# Patient Record
Sex: FEMALE | Race: WHITE | Employment: OTHER | URBAN - METROPOLITAN AREA
[De-identification: names, ages, dates, MRNs, and addresses within clinical notes are randomized per-mention and may not be internally consistent; named-entity substitution may affect disease eponyms.]

---

## 2019-06-11 ENCOUNTER — HOSPITAL ENCOUNTER (OUTPATIENT)
Dept: GENERAL RADIOLOGY | Age: 79
Discharge: HOME | End: 2019-06-13
Payer: MEDICARE

## 2019-06-11 ENCOUNTER — HOSPITAL ENCOUNTER (OUTPATIENT)
Age: 79
Discharge: HOME | End: 2019-06-13
Payer: MEDICARE

## 2019-06-11 DIAGNOSIS — M75.22 BICEPS TENDINITIS OF BOTH SHOULDERS: ICD-10-CM

## 2019-06-11 DIAGNOSIS — M75.21 BICEPS TENDINITIS OF BOTH SHOULDERS: ICD-10-CM

## 2019-06-11 PROCEDURE — 73030 X-RAY EXAM OF SHOULDER: CPT

## 2020-01-27 ENCOUNTER — HOSPITAL ENCOUNTER (OUTPATIENT)
Dept: GENERAL RADIOLOGY | Age: 80
Discharge: HOME | End: 2020-01-29
Payer: MEDICARE

## 2020-01-27 ENCOUNTER — HOSPITAL ENCOUNTER (OUTPATIENT)
Age: 80
Discharge: HOME | End: 2020-01-29
Payer: MEDICARE

## 2020-01-27 PROCEDURE — 72050 X-RAY EXAM NECK SPINE 4/5VWS: CPT

## 2020-01-27 PROCEDURE — 72110 X-RAY EXAM L-2 SPINE 4/>VWS: CPT

## 2021-11-11 ENCOUNTER — OFFICE VISIT (OUTPATIENT)
Dept: CARDIOLOGY CLINIC | Age: 81
End: 2021-11-11
Payer: MEDICARE

## 2021-11-11 VITALS
HEART RATE: 86 BPM | SYSTOLIC BLOOD PRESSURE: 102 MMHG | WEIGHT: 179.6 LBS | BODY MASS INDEX: 33.91 KG/M2 | DIASTOLIC BLOOD PRESSURE: 76 MMHG | OXYGEN SATURATION: 94 % | HEIGHT: 61 IN | RESPIRATION RATE: 18 BRPM

## 2021-11-11 DIAGNOSIS — E78.00 HYPERCHOLESTEREMIA: ICD-10-CM

## 2021-11-11 DIAGNOSIS — I10 PRIMARY HYPERTENSION: ICD-10-CM

## 2021-11-11 DIAGNOSIS — I25.10 CORONARY ARTERY DISEASE WITHOUT ANGINA PECTORIS, UNSPECIFIED VESSEL OR LESION TYPE, UNSPECIFIED WHETHER NATIVE OR TRANSPLANTED HEART: Primary | ICD-10-CM

## 2021-11-11 DIAGNOSIS — I25.5 ISCHEMIC CARDIOMYOPATHY: ICD-10-CM

## 2021-11-11 DIAGNOSIS — E66.09 CLASS 1 OBESITY DUE TO EXCESS CALORIES WITHOUT SERIOUS COMORBIDITY WITH BODY MASS INDEX (BMI) OF 34.0 TO 34.9 IN ADULT: ICD-10-CM

## 2021-11-11 PROCEDURE — 93000 ELECTROCARDIOGRAM COMPLETE: CPT | Performed by: INTERNAL MEDICINE

## 2021-11-11 PROCEDURE — 99204 OFFICE O/P NEW MOD 45 MIN: CPT | Performed by: INTERNAL MEDICINE

## 2021-11-11 NOTE — PROGRESS NOTES
CHIEF COMPLAINT:   Chief Complaint   Patient presents with    Coronary Artery Disease     new patient- pt has complaints of sob        HISTORY OF PRESENT ILLNESS: Patient is a 80 y.o. female seen at the request of Marco A Latham MD to establish care with me. She has a history of ischemic cardiomyopathy, with recovered ejection fraction, coronary artery disease status post PCI, hypertension, hyperlipidemia, moderate obesity. At today's office visit, Shedenies any chest pain, shortness of breath, palpitations, dizziness, pedal edema. The patient is capable of activities of daily living. There is dyspnea on more than moderate exertion. There is no orthopnea or PND. She is compliant with medications as well as diet and exercise regimen. Prior Cardiac workup:  History of ischemic cardiomyopathy since 2007, with echocardiogram at      that time showing EF of 25%, and a cardiac catheterization in May 2007,      showing LAD stenosis; 80% to 90%; and an EF of 20% to 25%. No disease of      the circumflex or RCA. Had a drug-eluting stent to the LAD with last      Lexiscan nuclear stress test in June 2014, being nonischemic and      hyperdynamic left ventricular systolic function. Nuclear perfusion scan from 4/9/2015: No perfusion defects. Past Medical History:   Diagnosis Date    ACL (anterior cruciate ligament) rupture     Acute ischemic colitis ([address] 4/30/2012    Anxiety     Arthritis     CAD (coronary artery disease)     Cardiomyopathy ([address]     Carotid artery stenosis 1/5/2012     Carotid ultrasound showed 50-69% stenosis of the middle segment of the left internal carotid artery.     Chest pain 4/8/2015    CHF (congestive heart failure) ([address]     Diabetes mellitus ([address]     no meds    Diabetes mellitus type 2, controlled ([address] 1/18/2013    Diabetic retinopathy ([address]     Generalized osteoarthritis of multiple sites     Heart murmur     History of tobacco abuse     Hypercalcemia     Hypercholesterolemia     Hyperlipidemia     Hypertension     LV dysfunction     Severe.  Mitral regurgitation     mild    Obesity     Preoperative clearance 2-13-15    Cardiac clearance for OR 3-18-15 from Dr. Flavio Bullard in [facility] letters    Rectal bleeding 4/20/2012 4/26/2012 Exploratory laparotomy, lysis of excessive adhesions, left hemicolectomy and intraoperative colonoscopy:  Findings consistnet with ischemic colitis, segmental, 50 cm from the anal verge. Pathology negative for malignancy.  Renal cyst, left     On ultrasound 5/1/2007. remains current    Tear meniscus knee 3/17/2011    Small medial meniscal tear and medial collateral ligament tear of right knee noted on MRI. S/P right knee arthroscopic surgery in 6/2011.  Tear meniscus knee     Thyroid nodule        Allergies   Allergen Reactions    Meloxicam      Sensitivity. Muscle aches, fullness in chest.    Metformin And Related [Metformin And Related]      Caused metabolic acidosis. Current Outpatient Medications   Medication Sig Dispense Refill    nitroGLYCERIN (NITROSTAT) 0.4 MG SL tablet Place 1 tablet under the tongue every 5 minutes as needed for Chest pain 25 tablet 3    mirtazapine (REMERON) 15 MG tablet Take 1 tablet by mouth nightly. 30 tablet 3    carvedilol (COREG) 25 MG tablet Take 25 mg by mouth 2 times daily (with meals). Instructed to take with sip water am of procedure      Calcium Carbonate-Vitamin D (CALCIUM + D) 600-200 MG-UNIT TABS Take 1 tablet by mouth daily. LD 3/11/2015      lisinopril (PRINIVIL;ZESTRIL) 20 MG tablet Take 20 mg by mouth daily.  furosemide (LASIX) 20 MG tablet Take 20 mg by mouth daily.  levothyroxine (SYNTHROID) 25 MCG tablet Take 25 mcg by mouth Daily.  aspirin 81 MG tablet Take 81 mg by mouth daily. LD 3/11/2015      isosorbide mononitrate (IMDUR) 30 MG CR tablet Take 1 tablet by mouth daily.  30 tablet 12    Ranitidine HCl (ZANTAC 75 PO) Take 1 tablet by mouth as  Marital Status: Not on file   Intimate Partner Violence:     Fear of Current or Ex-Partner: Not on file    Emotionally Abused: Not on file    Physically Abused: Not on file    Sexually Abused: Not on file   Housing Stability:     Unable to Pay for Housing in the Last Year: Not on file    Number of Places Lived in the Last Year: Not on file    Unstable Housing in the Last Year: Not on file       History reviewed. No pertinent family history. Review of Systems  Constitutional: Negative for fever, malaise/fatigue and weight loss. HENT: Negative for sore throat and tinnitus. Eyes: Negative for blurred vision and double vision. Respiratory: Negative for shortness of breath. Negative for cough and wheezing. Cardiovascular: As mentioned in HPI. Gastrointestinal: Negative for abdominal pain, heartburn, nausea and vomiting. Genitourinary: Negative. Musculoskeletal: Negative for back pain, joint pain and myalgias. Neurological: Negative for dizziness, tremors, loss of consciousness and headaches. Endo/Heme/Allergies: Negative. Psychiatric/Behavioral: Negative for depression and suicidal ideas. Physical Exam   /76   Pulse 86   Resp 18   Ht 5' 1\" (1.549 m)   Wt 179 lb 9.6 oz (81.5 kg)   SpO2 94%   BMI 33.94 kg/m²   Constitutional: Oriented to person, place, and time. Well-developed and well-nourished. No distress. Head: Normocephalic and atraumatic. Eyes: EOM are normal. Pupils are equal, round, and reactive to light. Neck: Normal range of motion. Neck supple. No hepatojugular reflux and no JVD present. Carotid bruit is not present. No tracheal deviation present. No thyromegaly present. Cardiovascular: Normal rate, regular rhythm, normal heart sounds and intact distal pulses. Exam reveals no gallop and no friction rub. No murmur heard. Pulmonary/Chest: Effort normal and breath sounds normal. No respiratory distress. No wheezes. No rales. No tenderness.    Abdominal: Soft. Bowel sounds are normal. No distension and no mass. No tenderness. No rebound and no guarding. Musculoskeletal: Normal range of motion. No edema and no tenderness. Lymphadenopathy:   No cervical adenopathy. Neurological: Alert and oriented to person, place, and time. Skin: Skin is warm and dry. No rash noted. Not diaphoretic. No erythema. Psychiatric: Normal mood and affect. Behavior is normal.     Procedures and Testing  EKG: Done in the office today and reviewed by me. Normal sinus rhythm. No other abnormalities detected. ASSESSMENT:   Diagnosis Orders   1. Coronary artery disease without angina pectoris, unspecified vessel or lesion type, unspecified whether native or transplanted heart  EKG 12 Lead    ECHO Complete 2D W Doppler W Color   2. Primary hypertension     3. Hypercholesteremia     4. Class 1 obesity due to excess calories without serious comorbidity with body mass index (BMI) of 34.0 to 34.9 in adult     5. Ischemic cardiomyopathy          Plan:  1. Coronary artery disease status post PCI: She is stable and asymptomatic. Continue with medical management. Continue aspirin 81, Coreg 25 twice daily and Imdur 30 mg daily. She had blood work done recently and I will have those faxed to my office for review. 2.  Hypertension: Pressure is at goal in clinic today. Continue medications as above. She is also on Lasix 20 mg daily. 3.  Ischemic cardiomyopathy with recovered ejection fraction: She appears to be stable and asymptomatic. Continue medications as above. We will check an echocardiogram to assess biventricular function and rule out any significant valvular abnormalities. 4.  Hyperlipidemia: Currently, she is not on any statin therapy. I will have her recent blood work faxed to my office for review. Dietary counseling provided. 5.  Moderate obesity: Counseled for compliance with diet, exercise and weight loss. Follow-up in the office in 6 months.       Elieser Venegas MD  [facility] Cardiology     NOTE: This report was transcribed using voice recognition software. Every effort was made to ensure accuracy; however, inadvertent computerized transcription errors may be present.

## 2021-11-11 NOTE — PATIENT INSTRUCTIONS
 Continue all your medications at current doses.  We will get your recent blood work.  We will schedule an echocardiogram.    Restrict sodium intake to less than 2-2.5 g/day. Restrict fluid intake to less than 2.2 L/day. Goal BP is less than 130/80.  Please try to exercise for 150 minutes a week.  I will see you back in the office in 6 months. Please call the office at ([phone], option [phone]) if you have any questions.

## 2022-01-04 ENCOUNTER — HOSPITAL ENCOUNTER (INPATIENT)
Age: 82
LOS: 4 days | Discharge: HOME | DRG: 177 | End: 2022-01-08
Attending: EMERGENCY MEDICINE | Admitting: INTERNAL MEDICINE
Payer: MEDICARE

## 2022-01-04 ENCOUNTER — APPOINTMENT (OUTPATIENT)
Dept: GENERAL RADIOLOGY | Age: 82
DRG: 177 | End: 2022-01-04
Payer: MEDICARE

## 2022-01-04 ENCOUNTER — APPOINTMENT (OUTPATIENT)
Dept: CT IMAGING | Age: 82
DRG: 177 | End: 2022-01-04
Payer: MEDICARE

## 2022-01-04 DIAGNOSIS — J18.9 PNEUMONIA OF LEFT LOWER LOBE DUE TO INFECTIOUS ORGANISM: Primary | ICD-10-CM

## 2022-01-04 PROBLEM — N17.9 AKI (ACUTE KIDNEY INJURY) (HCC): Status: ACTIVE | Noted: 2022-01-04

## 2022-01-04 PROBLEM — U07.1 PNEUMONIA DUE TO COVID-19 VIRUS: Status: ACTIVE | Noted: 2022-01-04

## 2022-01-04 PROBLEM — J12.82 PNEUMONIA DUE TO COVID-19 VIRUS: Status: ACTIVE | Noted: 2022-01-04

## 2022-01-04 LAB
ANION GAP SERPL CALCULATED.3IONS-SCNC: 18 MMOL/L (ref 7–16)
BASOPHILS ABSOLUTE: 0.01 E9/L (ref 0–0.2)
BASOPHILS RELATIVE PERCENT: 0.3 % (ref 0–2)
BUN BLDV-MCNC: 45 MG/DL (ref 6–23)
C-REACTIVE PROTEIN: 6.7 MG/DL (ref 0–0.4)
CALCIUM SERPL-MCNC: 9.5 MG/DL (ref 8.6–10.2)
CHLORIDE BLD-SCNC: 103 MMOL/L (ref 98–107)
CHLORIDE URINE RANDOM: 25 MMOL/L
CO2: 17 MMOL/L (ref 22–29)
CREAT SERPL-MCNC: 2 MG/DL (ref 0.5–1)
D DIMER: 598 NG/ML DDU
EOSINOPHILS ABSOLUTE: 0 E9/L (ref 0.05–0.5)
EOSINOPHILS RELATIVE PERCENT: 0 % (ref 0–6)
FERRITIN: 1379 NG/ML
GFR AFRICAN AMERICAN: 29
GFR NON-AFRICAN AMERICAN: 24 ML/MIN/1.73
GLUCOSE BLD-MCNC: 115 MG/DL (ref 74–99)
HCT VFR BLD CALC: 32.9 % (ref 34–48)
HEMOGLOBIN: 10.8 G/DL (ref 11.5–15.5)
IMMATURE GRANULOCYTES #: 0.05 E9/L
IMMATURE GRANULOCYTES %: 1.6 % (ref 0–5)
INFLUENZA A BY PCR: NOT DETECTED
INFLUENZA B BY PCR: NOT DETECTED
LACTATE DEHYDROGENASE: 437 U/L (ref 135–214)
LACTIC ACID: 1.2 MMOL/L (ref 0.5–2.2)
LYMPHOCYTES ABSOLUTE: 1.15 E9/L (ref 1.5–4)
LYMPHOCYTES RELATIVE PERCENT: 35.7 % (ref 20–42)
MCH RBC QN AUTO: 30.7 PG (ref 26–35)
MCHC RBC AUTO-ENTMCNC: 32.8 % (ref 32–34.5)
MCV RBC AUTO: 93.5 FL (ref 80–99.9)
MONOCYTES ABSOLUTE: 0.22 E9/L (ref 0.1–0.95)
MONOCYTES RELATIVE PERCENT: 6.8 % (ref 2–12)
NEUTROPHILS ABSOLUTE: 1.79 E9/L (ref 1.8–7.3)
NEUTROPHILS RELATIVE PERCENT: 55.6 % (ref 43–80)
PDW BLD-RTO: 13.5 FL (ref 11.5–15)
PLATELET # BLD: 65 E9/L (ref 130–450)
PLATELET CONFIRMATION: NORMAL
PMV BLD AUTO: 12.4 FL (ref 7–12)
POTASSIUM REFLEX MAGNESIUM: 3.8 MMOL/L (ref 3.5–5)
POTASSIUM, UR: 19.9 MMOL/L
PROCALCITONIN: 0.3 NG/ML (ref 0–0.08)
PROCALCITONIN: 0.34 NG/ML (ref 0–0.08)
RBC # BLD: 3.52 E12/L (ref 3.5–5.5)
SARS-COV-2, NAAT: DETECTED
SODIUM BLD-SCNC: 138 MMOL/L (ref 132–146)
SODIUM URINE: 48 MMOL/L
TROPONIN, HIGH SENSITIVITY: 44 NG/L (ref 0–9)
UREA NITROGEN, UR: 894 MG/DL (ref 800–1666)
WBC # BLD: 3.2 E9/L (ref 4.5–11.5)

## 2022-01-04 PROCEDURE — 2580000003 HC RX 258: Performed by: INTERNAL MEDICINE

## 2022-01-04 PROCEDURE — 36415 COLL VENOUS BLD VENIPUNCTURE: CPT

## 2022-01-04 PROCEDURE — 84133 ASSAY OF URINE POTASSIUM: CPT

## 2022-01-04 PROCEDURE — 6360000002 HC RX W HCPCS: Performed by: INTERNAL MEDICINE

## 2022-01-04 PROCEDURE — 87635 SARS-COV-2 COVID-19 AMP PRB: CPT

## 2022-01-04 PROCEDURE — 82436 ASSAY OF URINE CHLORIDE: CPT

## 2022-01-04 PROCEDURE — 83615 LACTATE (LD) (LDH) ENZYME: CPT

## 2022-01-04 PROCEDURE — 82728 ASSAY OF FERRITIN: CPT

## 2022-01-04 PROCEDURE — 85378 FIBRIN DEGRADE SEMIQUANT: CPT

## 2022-01-04 PROCEDURE — 93005 ELECTROCARDIOGRAM TRACING: CPT | Performed by: EMERGENCY MEDICINE

## 2022-01-04 PROCEDURE — 2500000003 HC RX 250 WO HCPCS: Performed by: INTERNAL MEDICINE

## 2022-01-04 PROCEDURE — 84540 ASSAY OF URINE/UREA-N: CPT

## 2022-01-04 PROCEDURE — 83605 ASSAY OF LACTIC ACID: CPT

## 2022-01-04 PROCEDURE — 70450 CT HEAD/BRAIN W/O DYE: CPT

## 2022-01-04 PROCEDURE — 84484 ASSAY OF TROPONIN QUANT: CPT

## 2022-01-04 PROCEDURE — 6370000000 HC RX 637 (ALT 250 FOR IP): Performed by: INTERNAL MEDICINE

## 2022-01-04 PROCEDURE — 6370000000 HC RX 637 (ALT 250 FOR IP): Performed by: EMERGENCY MEDICINE

## 2022-01-04 PROCEDURE — 2140000000 HC CCU INTERMEDIATE R&B

## 2022-01-04 PROCEDURE — 85025 COMPLETE CBC W/AUTO DIFF WBC: CPT

## 2022-01-04 PROCEDURE — 71045 X-RAY EXAM CHEST 1 VIEW: CPT

## 2022-01-04 PROCEDURE — 80048 BASIC METABOLIC PNL TOTAL CA: CPT

## 2022-01-04 PROCEDURE — 84300 ASSAY OF URINE SODIUM: CPT

## 2022-01-04 PROCEDURE — 84145 PROCALCITONIN (PCT): CPT

## 2022-01-04 PROCEDURE — 86140 C-REACTIVE PROTEIN: CPT

## 2022-01-04 PROCEDURE — 87502 INFLUENZA DNA AMP PROBE: CPT

## 2022-01-04 PROCEDURE — 87449 NOS EACH ORGANISM AG IA: CPT

## 2022-01-04 PROCEDURE — 99285 EMERGENCY DEPT VISIT HI MDM: CPT

## 2022-01-04 RX ORDER — LEVOTHYROXINE SODIUM 0.03 MG/1
25 TABLET ORAL DAILY
Status: DISCONTINUED | OUTPATIENT
Start: 2022-01-04 | End: 2022-01-08 | Stop reason: HOSPADM

## 2022-01-04 RX ORDER — MIRTAZAPINE 15 MG/1
15 TABLET, FILM COATED ORAL NIGHTLY
Status: DISCONTINUED | OUTPATIENT
Start: 2022-01-04 | End: 2022-01-08 | Stop reason: HOSPADM

## 2022-01-04 RX ORDER — SODIUM CHLORIDE 0.9 % (FLUSH) 0.9 %
5-40 SYRINGE (ML) INJECTION PRN
Status: DISCONTINUED | OUTPATIENT
Start: 2022-01-04 | End: 2022-01-08 | Stop reason: HOSPADM

## 2022-01-04 RX ORDER — ACETAMINOPHEN 650 MG/1
650 SUPPOSITORY RECTAL EVERY 6 HOURS PRN
Status: DISCONTINUED | OUTPATIENT
Start: 2022-01-04 | End: 2022-01-08 | Stop reason: HOSPADM

## 2022-01-04 RX ORDER — 0.9 % SODIUM CHLORIDE 0.9 %
1000 INTRAVENOUS SOLUTION INTRAVENOUS ONCE
Status: COMPLETED | OUTPATIENT
Start: 2022-01-04 | End: 2022-01-04

## 2022-01-04 RX ORDER — ACETAMINOPHEN 325 MG/1
650 TABLET ORAL EVERY 6 HOURS PRN
Status: DISCONTINUED | OUTPATIENT
Start: 2022-01-04 | End: 2022-01-08 | Stop reason: HOSPADM

## 2022-01-04 RX ORDER — CARVEDILOL 25 MG/1
25 TABLET ORAL 2 TIMES DAILY WITH MEALS
Status: DISCONTINUED | OUTPATIENT
Start: 2022-01-04 | End: 2022-01-08 | Stop reason: HOSPADM

## 2022-01-04 RX ORDER — SODIUM CHLORIDE 9 MG/ML
25 INJECTION, SOLUTION INTRAVENOUS PRN
Status: DISCONTINUED | OUTPATIENT
Start: 2022-01-04 | End: 2022-01-08 | Stop reason: HOSPADM

## 2022-01-04 RX ORDER — ISOSORBIDE MONONITRATE 30 MG/1
30 TABLET, EXTENDED RELEASE ORAL DAILY
Status: DISCONTINUED | OUTPATIENT
Start: 2022-01-04 | End: 2022-01-08 | Stop reason: HOSPADM

## 2022-01-04 RX ORDER — ASPIRIN 81 MG/1
81 TABLET ORAL DAILY
Status: DISCONTINUED | OUTPATIENT
Start: 2022-01-04 | End: 2022-01-08 | Stop reason: HOSPADM

## 2022-01-04 RX ORDER — SODIUM CHLORIDE 9 MG/ML
INJECTION, SOLUTION INTRAVENOUS CONTINUOUS
Status: DISCONTINUED | OUTPATIENT
Start: 2022-01-04 | End: 2022-01-05

## 2022-01-04 RX ORDER — HEPARIN SODIUM 10000 [USP'U]/ML
5000 INJECTION, SOLUTION INTRAVENOUS; SUBCUTANEOUS EVERY 8 HOURS SCHEDULED
Status: DISCONTINUED | OUTPATIENT
Start: 2022-01-04 | End: 2022-01-08 | Stop reason: HOSPADM

## 2022-01-04 RX ORDER — SODIUM CHLORIDE 0.9 % (FLUSH) 0.9 %
5-40 SYRINGE (ML) INJECTION EVERY 12 HOURS SCHEDULED
Status: DISCONTINUED | OUTPATIENT
Start: 2022-01-04 | End: 2022-01-08 | Stop reason: HOSPADM

## 2022-01-04 RX ORDER — ACETAMINOPHEN 325 MG/1
650 TABLET ORAL ONCE
Status: COMPLETED | OUTPATIENT
Start: 2022-01-04 | End: 2022-01-04

## 2022-01-04 RX ADMIN — Medication 10 ML: at 20:22

## 2022-01-04 RX ADMIN — CEFTRIAXONE 1000 MG: 1 INJECTION, POWDER, FOR SOLUTION INTRAMUSCULAR; INTRAVENOUS at 16:25

## 2022-01-04 RX ADMIN — SODIUM CHLORIDE: 9 INJECTION, SOLUTION INTRAVENOUS at 20:11

## 2022-01-04 RX ADMIN — ASPIRIN 81 MG: 81 TABLET, COATED ORAL at 20:12

## 2022-01-04 RX ADMIN — CARVEDILOL 25 MG: 25 TABLET, FILM COATED ORAL at 20:12

## 2022-01-04 RX ADMIN — DOXYCYCLINE 100 MG: 100 INJECTION, POWDER, LYOPHILIZED, FOR SOLUTION INTRAVENOUS at 16:25

## 2022-01-04 RX ADMIN — HEPARIN SODIUM 5000 UNITS: 10000 INJECTION INTRAVENOUS; SUBCUTANEOUS at 22:00

## 2022-01-04 RX ADMIN — MIRTAZAPINE 15 MG: 15 TABLET, FILM COATED ORAL at 21:00

## 2022-01-04 RX ADMIN — ACETAMINOPHEN 650 MG: 325 TABLET ORAL at 20:12

## 2022-01-04 RX ADMIN — SODIUM CHLORIDE 1000 ML: 9 INJECTION, SOLUTION INTRAVENOUS at 12:21

## 2022-01-04 RX ADMIN — CALCIUM CARBONATE-VITAMIN D TAB 500 MG-200 UNIT 1 TABLET: 500-200 TAB at 20:12

## 2022-01-04 ASSESSMENT — ENCOUNTER SYMPTOMS
VOICE CHANGE: 0
NAUSEA: 0
WHEEZING: 0
CONSTIPATION: 0
BLOOD IN STOOL: 0
VOMITING: 0
SORE THROAT: 1
TROUBLE SWALLOWING: 0
APNEA: 0
SINUS PAIN: 0
STRIDOR: 0
CHOKING: 0
PHOTOPHOBIA: 0
CHEST TIGHTNESS: 0
EYE PAIN: 0
ABDOMINAL PAIN: 0
COUGH: 1
SHORTNESS OF BREATH: 1
DIARRHEA: 1
RHINORRHEA: 0
SINUS PRESSURE: 0

## 2022-01-04 ASSESSMENT — PAIN SCALES - GENERAL
PAINLEVEL_OUTOF10: 3
PAINLEVEL_OUTOF10: 6

## 2022-01-04 NOTE — PROGRESS NOTES
Pharmacy Consultation Note    Consult date: 1/4/2022  Physician/provider: Dr. Alma Delia Woodruff has been consulted to evaluate criteria for Baricitinib or Remdesivir therapy. Based on the algorithm, the patient DOES NOT currently meet [facility] [employer] approved Covid-19 treatment algorithm for Baricitinib or Remdesivir. Provider has been notified. Please re-consult if the clinical condition changes and patient meets criteria for initiation based on [facility] [employer] approved criteria for use.     Thank you for the consult,  Duard Bumpers, [facility]

## 2022-01-04 NOTE — H&P
Hospitalist History & Physical      PCP: Stan Morse MD    Date of Admission: 1/4/2022    Date of Service: Pt seen/examined on 1/4/2022 and is admitted to Inpatient with expected LOS greater than two midnights due to medical therapy. Chief Complaint: Abnormal labs    History Of Present Illness:    Ms. Hardik Oropeza, a 80y.o. year old female      Experience symptoms of loose stools for the past 2 weeks  Symptoms began approximately 2 weeks ago and have not improved  She is vaccinated to COVID-19/Moderna however has not received a booster    In addition to the symptoms of loose stools as well as diarrhea  She has not used antibiotics within the last several weeks  She experienced a headache and a fever a few days ago  Along with a nonproductive cough  Painful pharyngitis  Some sinus drainage  And the loss of the sense of taste and smell  She denies eye pain or conjunctivitis  She did feel weak  Apparently she fell twice in the last few days and did strike her head she denied loss of consciousness after these events      Past Medical History:        Diagnosis Date    ACL (anterior cruciate ligament) rupture     Acute ischemic colitis ([address] 4/30/2012    Anxiety     Arthritis     CAD (coronary artery disease)     Cardiomyopathy ([address]     Carotid artery stenosis 1/5/2012     Carotid ultrasound showed 50-69% stenosis of the middle segment of the left internal carotid artery.  Chest pain 4/8/2015    CHF (congestive heart failure) ([address]     Diabetes mellitus ([address]     no meds    Diabetes mellitus type 2, controlled ([address] 1/18/2013    Diabetic retinopathy ([address]     Generalized osteoarthritis of multiple sites     Heart murmur     History of tobacco abuse     Hypercalcemia     Hypercholesterolemia     Hyperlipidemia     Hypertension     LV dysfunction     Severe.     Mitral regurgitation     mild    Obesity     Preoperative clearance 2-13-15    [facility]    Rectal bleeding 2012 Exploratory laparotomy, lysis of excessive adhesions, left hemicolectomy and intraoperative colonoscopy:  Findings consistnet with ischemic colitis, segmental, 50 cm from the anal verge. Pathology negative for malignancy.  Renal cyst, left     On ultrasound 2007. remains current    Tear meniscus knee 3/17/2011    Small medial meniscal tear and medial collateral ligament tear of right knee noted on MRI. S/P right knee arthroscopic surgery in 2011.  Tear meniscus knee     Thyroid nodule        Past Surgical History:        Procedure Laterality Date    APPENDECTOMY      CARDIOVASCULAR STRESS TEST  2012  Lexiscan nuclear stress test done at [facility]. No evidence of scars or stress-induced ischemia with an EF of 71%.  CATARACT REMOVAL WITH IMPLANT  2009 Right.    2012 Left.   SECTION      x 2.    COLONOSCOPY  2013    CORONARY ANGIOPLASTY WITH STENT PLACEMENT      DIAGNOSTIC CARDIAC CATH LAB PROCEDURE  2007    Cath/angioplasty. Left main, no significant disease. LAD 80-90% proximal stenosis. LCX large codominant vessel with no significant disease. RCA moderate codominant vessel with  no significant disesae. MIldly dilated ventricle with severe generalized hypokinesis with an estimated EF of 20-35% with trace mitral regurg. Markedly elevated left ventricular end-diastolic pressure.  DIAGNOSTIC CARDIAC CATH LAB PROCEDURE  2007 continued    Systemic hypertension. Successful balloon angioplsaty with the deployment of drug-eluting coronary stent to the proximal LAD with very good results. Closure of the right femoral artery access site with AngioSeal device.     ECHO COMPL W DOP COLOR FLOW  2012     Normal LV size, wall thickness, wall motion and systolic function with an EF of more than 55% with stage I left ventricular diastolic dysfunction, normal right  ventricular size and function,, mildly sclerotic aortic valve and there wsa aortic root sclerosis/calcification.  EYE SURGERY Bilateral     cateract extraction    HYSTERECTOMY  1980.  JOINT REPLACEMENT Left 3-    left total knee arthroplasty    SUBTOTAL COLECTOMY   4/26/12    LEFT ADITI WITH [facility] A[facility]    TONSILLECTOMY         Medications Prior to Admission:      Prior to Admission medications    Medication Sig Start Date End Date Taking? Authorizing Provider   nitroGLYCERIN (NITROSTAT) 0.4 MG SL tablet Place 1 tablet under the tongue every 5 minutes as needed for Chest pain 3/8/16   JOSELINE Cooper - CNP   mirtazapine (REMERON) 15 MG tablet Take 1 tablet by mouth nightly. 4/9/15   Linden Ley DO   carvedilol (COREG) 25 MG tablet Take 25 mg by mouth 2 times daily (with meals). Instructed to take with sip water am of procedure    Historical Provider, MD   Calcium Carbonate-Vitamin D (CALCIUM + D) 600-200 MG-UNIT TABS Take 1 tablet by mouth daily. LD 3/11/2015    Historical Provider, MD   lisinopril (PRINIVIL;ZESTRIL) 20 MG tablet Take 20 mg by mouth daily. Historical Provider, MD   furosemide (LASIX) 20 MG tablet Take 20 mg by mouth daily. Historical Provider, MD   levothyroxine (SYNTHROID) 25 MCG tablet Take 25 mcg by mouth Daily. Historical Provider, MD   aspirin 81 MG tablet Take 81 mg by mouth daily. LD 3/11/2015    Historical Provider, MD   Ranitidine HCl (ZANTAC 75 PO) Take 1 tablet by mouth as needed. Patient not taking: Reported on 11/11/2021    Historical Provider, MD   vitamin B-12 (CYANOCOBALAMIN) 1000 MCG tablet Take 1,000 mcg by mouth daily. LD 3/11/2015  Patient not taking: Reported on 11/11/2021    Historical Provider, MD   isosorbide mononitrate (IMDUR) 30 MG CR tablet Take 1 tablet by mouth daily. 5/2/12   Leo Nowak MD       Allergies:  Meloxicam and Metformin and related [metformin and related]    Social History:    TOBACCO:   reports that she quit smoking about 39 years ago. Her smoking use included cigarettes. She has a 12.00 pack-year smoking history. She has never used smokeless tobacco.  ETOH:   reports current alcohol use. Family History:    Reviewed in detail and negative for DM, CAD, Cancer, CVA. Positive as follows\"  History reviewed. No pertinent family history. REVIEW OF SYSTEMS:   As per HPI  Remainder of the review of systems negative    PHYSICAL EXAM:  BP (!) 142/93   Pulse 84   Temp 97.4 °F (36.3 °C)   Resp 17   Ht 5' 1\" (1.549 m)   Wt 180 lb (81.6 kg)   SpO2 94%   BMI 34.01 kg/m²   General appearance: Elderly female not diaphoretic     Overall appears comfortable and not in extremis    HEENT: There is no thrush  Neck:  Trachea midline.   Respiratory: Patient is on room air no wheeze on auscultation  Cardiovascular: s1 s2 audible there is no murmur  Abdomen: No distention  Musculoskeletal: No clubbing,  Skin: No petechiae    Neurologic: Awake and alert speech is clear  Psychiatric: Calm and polite  No EKG available today     and CXR I did review the film loaded up in EMr  Compared chest x-ray scanned in from January 4 to a chest x-ray scanned in from April 18, 2017  Accounting for difference in technique portable film on January 4 versus PA and lateral 2 view 2017  On the January 4 study there might be some degree of interstitial edema but I do not see any pleural effusions or significant lobar opacities or interstitial infiltrates    CBC:   Recent Labs     01/04/22  1221   WBC 3.2*   RBC 3.52   HGB 10.8*   HCT 32.9*   MCV 93.5   RDW 13.5   PLT 65*     BMP:   Recent Labs     01/04/22  1221      K 3.8      CO2 17*   BUN 45*   CREATININE 2.0*      Lactic Acid:   Lab Results   Component Value Date    LACTA 1.2 01/04/2022         Imaging:  XR CHEST PORTABLE    Result Date: 1/4/2022  EXAMINATION: ONE XRAY VIEW OF THE CHEST 1/4/2022 12:36 pm COMPARISON: Chest radiograph April 18, 2017 HISTORY: ORDERING SYSTEM PROVIDED HISTORY: b/l rhonchi on exam TECHNOLOGIST PROVIDED HISTORY:   Moderate interstitial edema Cardiomegaly. Small bilateral pleural effusions. ASSESSMENT: COVID-19 likely cause GI symptoms of diarrhea which led to significant dehydration and volume depletion contributing to weakness and acute kidney injury  The weakness likely contributed to her fall episode    Fall because closed head injury    Principal Problem:    Pneumonia due to COVID-19 virus  Active Problems:    BRYAN (acute kidney injury) ([address]    CAD (coronary artery disease)    HTN (hypertension)    Hypercholesteremia    Ischemic cardiomyopathy  Resolved Problems:    * No resolved hospital problems. *      PLAN: CT scan of the brain has been ordered by emergency department physician I feel this is reasonable    Given the patient's age as well as laboratory abnormalities  It would be reasonable to admit the patient for treatment with IV fluids  And to monitor her renal chemistries    Resume her antihypertensives but hold her diuretics    Monitor her oxygen status which as of now is in a fairly reassuring status    Hold scheduled cathartics as patient is having loose stools      Diet: No diet orders on file  Code Status: Prior    Surrogate decision maker confirmed with patient:  Primary Emergency ContactKaren AldenAime Phone: [phone]    DVT Prophylaxis: []Lovenox [x]Heparin []PCD [] [address] []Encouraged ambulation    Disposition: [x]Med/Surg [] Intermediate [] ICU/CCU  Admit status: [] Observation [x] Inpatient     +++++++++++++++++++++++++++++++++++++++++++++++++  DO Leroy Ann Physician - [address] [place], [place]  +++++++++++++++++++++++++++++++++++++++++++++++++  NOTE: This report was transcribed using voice recognition software. Every effort was made to ensure accuracy; however, inadvertent computerized transcription errors may be present.

## 2022-01-04 NOTE — ED PROVIDER NOTES
[facility] [address] [address]  Department of Emergency Medicine     Written by: Melba Diaz DO  Patient Name: Hardik Oropeza  Attending Provider: Amy Tripathi DO  Admit Date: 2022 11:39 AM  MRN: [id number]                   : 1940        Chief Complaint   Patient presents with    Concern For COVID-19     symtpoms x approx 2 weeks, weakness, sob, fever, diarrhea, fall yesterday    - Chief complaint    Hardik Oropeza is 80 y.o. female who presents to [facility] via ambulance after her  called EMS after being concerned that the patient fell twice. Per patient she has been feeling weak for the last 2 weeks. She notes subjective shortness of breath, fever, diarrhea. She reports she fell for the second time yesterday and hit her head. Denies any visual changes, denies headache, diplopia. Reports she was having nausea, however that has resolved. She is concerned she got exposed to COVID-19. Patient is vaccinated with the Moderna vaccine. Last vaccine was given in 2021. Patient is supposed to get the booster sometime soon. Denies any sick contacts. Denies receiving the flu shot. Patient reports she is also been having diarrhea, almost hourly. Reports diarrhea is soft in consistency, not watery. Denies any melena or hematochezia. Reports diet has significantly changed in the last 2 weeks because of her symptoms. She notes she only had a fruit cup yesterday. Patient resides at home with her . Patient's children live close by and come check in on her frequently. Denies ever having pneumonia. Prior smoker- quit 40+ years ago. Reports a cough without any sputum production. Denies any chest pain, palpitations, abdominal pain, constipation, vomiting. Denies any paresthesias of the upper or lower extremities. Review of Systems   Constitutional: Positive for appetite change, chills and fatigue. Negative for diaphoresis, fever and unexpected weight change.    HENT: Positive for sore throat. Negative for ear pain, hearing loss, rhinorrhea, sinus pressure, sinus pain, sneezing, tinnitus, trouble swallowing and voice change. Eyes: Negative for photophobia, pain and visual disturbance. Respiratory: Positive for cough and shortness of breath. Negative for apnea, choking, chest tightness, wheezing and stridor. Cardiovascular: Negative for chest pain, palpitations and leg swelling. Gastrointestinal: Positive for diarrhea. Negative for abdominal pain, blood in stool, constipation, nausea and vomiting. Endocrine: Negative for polydipsia and polyuria. Genitourinary: Negative for dysuria, frequency and hematuria. Musculoskeletal: Negative for arthralgias, myalgias, neck pain and neck stiffness. Skin: Negative for rash and wound. Neurological: Positive for weakness. Negative for dizziness, tremors, syncope, facial asymmetry, light-headedness, numbness and headaches. +fall x2   Psychiatric/Behavioral: Negative for agitation, behavioral problems, confusion, hallucinations, sleep disturbance and suicidal ideas. The patient is not nervous/anxious. Physical Exam  Constitutional:       General: She is not in acute distress. Appearance: Normal appearance. She is obese. She is not ill-appearing, toxic-appearing or diaphoretic. HENT:      Head: Normocephalic and atraumatic. Comments: Small bump superior R parietal region of skull, non-tender, atraumatic, no erythema is present. No ecchymosis . Right Ear: External ear normal.      Left Ear: External ear normal.      Nose: Nose normal. No congestion or rhinorrhea. Mouth/Throat:      Mouth: Mucous membranes are dry. Pharynx: Oropharynx is clear. Eyes:      Extraocular Movements: Extraocular movements intact. Pupils: Pupils are equal, round, and reactive to light. Cardiovascular:      Rate and Rhythm: Normal rate and regular rhythm. Pulses: Normal pulses.       Heart sounds: Normal heart sounds. No murmur heard. No friction rub. No gallop. Pulmonary:      Effort: Pulmonary effort is normal. No respiratory distress. Breath sounds: No stridor. Rhonchi present. No wheezing or rales. Chest:      Chest wall: No tenderness. Abdominal:      General: Abdomen is flat. Bowel sounds are normal. There is no distension. Palpations: Abdomen is soft. There is no mass. Tenderness: There is no abdominal tenderness. There is no guarding or rebound. Hernia: No hernia is present. Musculoskeletal:         General: Normal range of motion. Cervical back: Normal range of motion and neck supple. Skin:     General: Skin is warm and dry. Capillary Refill: Capillary refill takes less than 2 seconds. Coloration: Skin is not jaundiced. Findings: No bruising, lesion or rash. Neurological:      General: No focal deficit present. Mental Status: She is alert and oriented to person, place, and time. Cranial Nerves: Cranial nerves are intact. No cranial nerve deficit. Sensory: Sensation is intact. Motor: No weakness, tremor, atrophy, abnormal muscle tone, seizure activity or pronator drift. Coordination: Coordination normal.   Psychiatric:         Mood and Affect: Mood normal.         Behavior: Behavior normal.         Thought Content: Thought content normal.         Judgment: Judgment normal.          Procedures       MDM  Number of Diagnoses or Management Options  Diagnosis management comments: 1/4/2021 at 1200   Diarrhea plus weakness suspect 2/2 viral infection  Vitals are currently stable. Satting appropriately on RA. We will currently check rapid Covid rapid influenza screen. Check CBC and BMP. Also CXR. Also give 1 L fluid bolus. History of fall x2- Check CT head. UPDATE 1/4/2021 at 2:15 pm - Decision to Admit patient.   Will discuss with IM team.   A/P :   BRYAN stage unknown  Suspect prerenal from decreased p.o. intake  Status post 1 L fluid bolus  BRYAN work up pending. High anion gap metabolic acidosis  Check lactic acid    Pancytopenia, suspect 2/2 infection? Pneumonia, left lower lobe  Give 1 dose ceftriaxone and doxycycline. COVID-19 positive  Supportive care  Satting appropriately on room air    UPDATE 2:52 pm   Pt complaining of chest pain, reproducible with coughing. Obtain EKG And troponin. Amount and/or Complexity of Data Reviewed  Clinical lab tests: reviewed    Patient Progress  Patient progress: stable       ED Course as of 01/04/22 1452   Tue Jan 04, 2022 9518 Alex Mascorro Spoke with Dr. Steffanie Tolentino who will admit the patient. [SO]      ED Course User Index  [SO] Johan Phillips DO        ED Course as of 01/11/22 1548   Tue Jan 04, 2022 9552 Alex Mascorro Spoke with Dr. Steffanie Tolentino who will admit the patient. [SO]      ED Course User Index  [SO] Johan Phillips DO       --------------------------------------------- PAST HISTORY ---------------------------------------------  Past Medical History:  has a past medical history of ACL (anterior cruciate ligament) rupture, Acute ischemic colitis ([address] Anxiety, Arthritis, CAD (coronary artery disease), Cardiomyopathy ([address] Carotid artery stenosis, Chest pain, CHF (congestive heart failure) ([address] Diabetes mellitus ([address] Diabetes mellitus type 2, controlled ([address] Diabetic retinopathy ([address] Generalized osteoarthritis of multiple sites, Heart murmur, History of tobacco abuse, Hypercalcemia, Hypercholesterolemia, Hyperlipidemia, Hypertension, LV dysfunction, Mitral regurgitation, Obesity, Preoperative clearance, Rectal bleeding, Renal cyst, left, Tear meniscus knee, Tear meniscus knee, and Thyroid nodule. Past Surgical History:  has a past surgical history that includes Coronary angioplasty with stent (2007); ECHO Compl W Dop Color Flow (4/24/2012); subtotal colectomy ( 4/26/12); Diagnostic Cardiac Cath Lab Procedure (5/4/2007);  Diagnostic Cardiac Cath Lab Procedure (5/4/2007 continued); cardiovascular stress test (2012  Lexiscan nuclear stress test done at [facility]  section; Hysterectomy (.); Tonsillectomy; Appendectomy; Cataract removal with implant (2009 Right.); Colonoscopy (2013); eye surgery (Bilateral); and joint replacement (Left, 3-). Social History:  reports that she quit smoking about 39 years ago. Her smoking use included cigarettes. She has a 12.00 pack-year smoking history. She has never used smokeless tobacco. She reports current alcohol use. She reports that she does not use drugs. Family History: family history is not on file. The patients home medications have been reviewed. Allergies: Meloxicam and Metformin and related [metformin and related]    -------------------------------------------------- RESULTS -------------------------------------------------    LABS:  Results for orders placed or performed during the hospital encounter of 22   COVID-19, Rapid    Specimen: Nasopharyngeal Swab   Result Value Ref Range    SARS-CoV-2, NAAT DETECTED (A) Not Detected   RAPID INFLUENZA A/B ANTIGENS    Specimen: Nasopharyngeal   Result Value Ref Range    Influenza A by PCR Not Detected Not Detected    Influenza B by PCR Not Detected Not Detected   Legionella antigen, urine    Specimen: Urine, clean catch   Result Value Ref Range    L. pneumophila Serogp 1 Ur Ag       Presumptive Negative -suggesting no recent or current infections  with Legionella pneumophila serogroup 1. Infection to Legionella cannot be ruled out since other serogroups  and species may cause infection, antigen may not be present in  early infection, or level of antigen may be below the  detection limit. Normal Range: Presumptive Negative     Strep Pneumoniae Antigen    Specimen: Urine, clean catch   Result Value Ref Range    STREP PNEUMONIAE ANTIGEN, URINE       Presumptive negative- suggests no current or recent  pneumococcal infection.   Infection due to Strep pneumoniae cannot be  ruled out since the antigen present in the sample  may be below the detection limit of the test.  Normal Range:Presumptive Negative     CBC Auto Differential   Result Value Ref Range    WBC 3.2 (L) 4.5 - 11.5 E9/L    RBC 3.52 3.50 - 5.50 E12/L    Hemoglobin 10.8 (L) 11.5 - 15.5 g/dL    Hematocrit 32.9 (L) 34.0 - 48.0 %    MCV 93.5 80.0 - 99.9 fL    MCH 30.7 26.0 - 35.0 pg    MCHC 32.8 32.0 - 34.5 %    RDW 13.5 11.5 - 15.0 fL    Platelets 65 (L) 619 - 450 E9/L    MPV 12.4 (H) 7.0 - 12.0 fL    Neutrophils % 55.6 43.0 - 80.0 %    Immature Granulocytes % 1.6 0.0 - 5.0 %    Lymphocytes % 35.7 20.0 - 42.0 %    Monocytes % 6.8 2.0 - 12.0 %    Eosinophils % 0.0 0.0 - 6.0 %    Basophils % 0.3 0.0 - 2.0 %    Neutrophils Absolute 1.79 (L) 1.80 - 7.30 E9/L    Immature Granulocytes # 0.05 E9/L    Lymphocytes Absolute 1.15 (L) 1.50 - 4.00 E9/L    Monocytes Absolute 0.22 0.10 - 0.95 E9/L    Eosinophils Absolute 0.00 (L) 0.05 - 0.50 E9/L    Basophils Absolute 0.01 0.00 - 0.20 Q5/V   Basic Metabolic Panel w/ Reflex to MG   Result Value Ref Range    Sodium 138 132 - 146 mmol/L    Potassium reflex Magnesium 3.8 3.5 - 5.0 mmol/L    Chloride 103 98 - 107 mmol/L    CO2 17 (L) 22 - 29 mmol/L    Anion Gap 18 (H) 7 - 16 mmol/L    Glucose 115 (H) 74 - 99 mg/dL    BUN 45 (H) 6 - 23 mg/dL    CREATININE 2.0 (H) 0.5 - 1.0 mg/dL    GFR Non-African American 24 >=60 mL/min/1.73    GFR African American 29     Calcium 9.5 8.6 - 10.2 mg/dL   Platelet Confirmation   Result Value Ref Range    Platelet Confirmation CONFIRMED    LACTIC ACID, PLASMA   Result Value Ref Range    Lactic Acid 1.2 0.5 - 2.2 mmol/L   Troponin   Result Value Ref Range    Troponin, High Sensitivity 44 (H) 0 - 9 ng/L   Lactate Dehydrogenase   Result Value Ref Range     (H) 135 - 214 U/L   Ferritin   Result Value Ref Range    Ferritin 1,379 ng/mL   D-Dimer, Quantitative   Result Value Ref Range    D-Dimer, Quant 598 ng/mL DDU   Procalcitonin   Result Value Ref Range    Procalcitonin 0.30 (H) 0.00 - 0.08 ng/mL   C-Reactive Protein   Result Value Ref Range    CRP 6.7 (H) 0.0 - 0.4 mg/dL   CBC Auto Differential   Result Value Ref Range    WBC 3.1 (L) 4.5 - 11.5 E9/L    RBC 3.48 (L) 3.50 - 5.50 E12/L    Hemoglobin 10.6 (L) 11.5 - 15.5 g/dL    Hematocrit 32.7 (L) 34.0 - 48.0 %    MCV 94.0 80.0 - 99.9 fL    MCH 30.5 26.0 - 35.0 pg    MCHC 32.4 32.0 - 34.5 %    RDW 13.5 11.5 - 15.0 fL    Platelets 67 (L) 519 - 450 E9/L    MPV 12.9 (H) 7.0 - 12.0 fL    Neutrophils % 54.7 43.0 - 80.0 %    Immature Granulocytes % 1.3 0.0 - 5.0 %    Lymphocytes % 37.6 20.0 - 42.0 %    Monocytes % 6.1 2.0 - 12.0 %    Eosinophils % 0.0 0.0 - 6.0 %    Basophils % 0.3 0.0 - 2.0 %    Neutrophils Absolute 1.72 (L) 1.80 - 7.30 E9/L    Immature Granulocytes # 0.04 E9/L    Lymphocytes Absolute 1.18 (L) 1.50 - 4.00 E9/L    Monocytes Absolute 0.19 0.10 - 0.95 E9/L    Eosinophils Absolute 0.00 (L) 0.05 - 0.50 E9/L    Basophils Absolute 0.01 0.00 - 0.20 E9/L   C-Reactive Protein   Result Value Ref Range    CRP 7.0 (H) 0.0 - 0.4 mg/dL   Comprehensive Metabolic Panel w/ Reflex to MG   Result Value Ref Range    Sodium 142 132 - 146 mmol/L    Potassium reflex Magnesium 3.4 (L) 3.5 - 5.0 mmol/L    Chloride 107 98 - 107 mmol/L    CO2 19 (L) 22 - 29 mmol/L    Anion Gap 16 7 - 16 mmol/L    Glucose 106 (H) 74 - 99 mg/dL    BUN 24 (H) 6 - 23 mg/dL    CREATININE 1.0 0.5 - 1.0 mg/dL    GFR Non-African American 53 >=60 mL/min/1.73    GFR African American >60     Calcium 9.2 8.6 - 10.2 mg/dL    Total Protein 7.0 6.4 - 8.3 g/dL    Albumin 3.6 3.5 - 5.2 g/dL    Total Bilirubin 0.2 0.0 - 1.2 mg/dL    Alkaline Phosphatase 44 35 - 104 U/L    ALT 11 0 - 32 U/L    AST 40 (H) 0 - 31 U/L   D-Dimer, Quantitative   Result Value Ref Range    D-Dimer, Quant 501 ng/mL DDU   Procalcitonin   Result Value Ref Range    Procalcitonin 0.34 (H) 0.00 - 0.08 ng/mL   Vitamin D 25 Hydroxy   Result Value Ref Range    Vit D, 25-Hydroxy 29 (L) 30 - 100 ng/mL   Urea nitrogen, urine   Result Value Ref Range    Urea Nitrogen, Ur 894 800 - 1666 mg/dL   Urine electrolytes   Result Value Ref Range    Sodium, Ur 48 Not Established mmol/L    Potassium, Ur 19.9 Not Established mmol/L    Chloride 25 Not Established mmol/L   Magnesium   Result Value Ref Range    Magnesium 1.9 1.6 - 2.6 mg/dL   Platelet Confirmation   Result Value Ref Range    Platelet Confirmation CONFIRMED    CBC WITH AUTO DIFFERENTIAL   Result Value Ref Range    WBC 2.8 (L) 4.5 - 11.5 E9/L    RBC 3.44 (L) 3.50 - 5.50 E12/L    Hemoglobin 10.4 (L) 11.5 - 15.5 g/dL    Hematocrit 30.8 (L) 34.0 - 48.0 %    MCV 89.5 80.0 - 99.9 fL    MCH 30.2 26.0 - 35.0 pg    MCHC 33.8 32.0 - 34.5 %    RDW 13.4 11.5 - 15.0 fL    Platelets 83 (L) 949 - 450 E9/L    MPV 12.6 (H) 7.0 - 12.0 fL    Neutrophils % 58.3 43.0 - 80.0 %    Lymphocytes % 35.6 20.0 - 42.0 %    Monocytes % 6.1 2.0 - 12.0 %    Eosinophils % 0.0 0.0 - 6.0 %    Basophils % 0.0 0.0 - 2.0 %    Neutrophils Absolute 1.62 (L) 1.80 - 7.30 E9/L    Lymphocytes Absolute 1.01 (L) 1.50 - 4.00 E9/L    Monocytes Absolute 0.17 0.10 - 0.95 E9/L    Eosinophils Absolute 0.00 (L) 0.05 - 0.50 E9/L    Basophils Absolute 0.00 0.00 - 0.20 E9/L    Poikilocytes 1+     Ovalocytes 1+    Platelet Confirmation   Result Value Ref Range    Platelet Confirmation CONFIRMED    Basic Metabolic Panel w/ Reflex to MG   Result Value Ref Range    Sodium 145 132 - 146 mmol/L    Potassium reflex Magnesium 3.2 (L) 3.5 - 5.0 mmol/L    Chloride 109 (H) 98 - 107 mmol/L    CO2 18 (L) 22 - 29 mmol/L    Anion Gap 18 (H) 7 - 16 mmol/L    Glucose 119 (H) 74 - 99 mg/dL    BUN 9 6 - 23 mg/dL    CREATININE 0.7 0.5 - 1.0 mg/dL    GFR Non-African American >60 >=60 mL/min/1.73    GFR African American >60     Calcium 9.4 8.6 - 10.2 mg/dL   Magnesium   Result Value Ref Range    Magnesium 1.8 1.6 - 2.6 mg/dL   EKG 12 Lead   Result Value Ref Range    Ventricular Rate 88 BPM    Atrial Rate 88 BPM    P-R Interval 154 ms    QRS Duration 76 ms    Q-T Interval 376 ms    QTc Calculation (Bazett) 454 ms    P Axis 42 degrees    R Axis 5 degrees    T Axis 5 degrees       RADIOLOGY:  CT Head WO Contrast   Final Result   1. No intracranial hemorrhage or acute intracranial disease identified. 2.  Chronic changes, as above. RECOMMENDATIONS:   Unavailable         XR CHEST PORTABLE   Final Result   Moderate interstitial edema      Cardiomegaly. Small bilateral pleural effusions.             ------------------------- NURSING NOTES AND VITALS REVIEWED ---------------------------  Date / Time Roomed:  1/4/2022 11:39 AM  ED Bed Assignment:  [id number]    The nursing notes within the ED encounter and vital signs as below have been reviewed. No data found.      ------------------------------------------ PROGRESS NOTES ------------------------------------------  Re-evaluation(s):  Patients symptoms show no change  Repeat physical examination is not changed    Counseling:  I have spoken with the patient and discussed todays results, in addition to providing specific details for the plan of care and counseling regarding the diagnosis and prognosis. Their questions are answered at this time and they are agreeable with the plan of admission.    --------------------------------- ADDITIONAL PROVIDER NOTES ---------------------------------  Consultations:   Spoke with Dr. Jae Marie. Discussed case. They will admit the patient. This patient's ED course included: a personal history and physicial examination, re-evaluation prior to disposition and multiple bedside re-evaluations    This patient has remained hemodynamically stable during their ED course. Diagnosis:  1. Pneumonia of left lower lobe due to infectious organism        Disposition:  Patient's disposition: Admit to telemetry  Patient's condition is stable. Patient was seen and evaluated by myself and my attending Vera Anderson DO.  Assessment and Plan discussed with attending provider, please see attestation for final plan of care. Johnny Maldonado DO  Resident  01/04/22 3125    ATTENDING PROVIDER ATTESTATION:     Heber Lanes presented to the emergency department for evaluation of Concern For COVID-19 (symtpoms x approx 2 weeks, weakness, sob, fever, diarrhea, fall yesterday)   and was initially evaluated by the Medical Resident. See Original ED Note for H&P and ED course above. I have reviewed and discussed the case, including pertinent history (medical, surgical, family and social) and exam findings with the Medical Resident assigned to Heber Lanes. I have personally performed and/or participated in the history, exam, medical decision making, and procedures and agree with all pertinent clinical information. I, Dr. Hayden Sheriff, am the primary provider of record       I have reviewed my findings and recommendations with the assigned Medical Resident, Heber Lanes and members of family present at the time of disposition. My findings/plan: The encounter diagnosis was Pneumonia of left lower lobe due to infectious organism.   Discharge Medication List as of 1/8/2022 12:53 PM      START taking these medications    Details   potassium chloride (KLOR-CON M) 20 MEQ extended release tablet Take 1 tablet by mouth 2 times daily (with meals), Disp-60 tablet, R-3Normal      doxycycline hyclate (VIBRAMYCIN) 100 MG capsule Take 1 capsule by mouth every 12 hours for 2 doses, Disp-2 capsule, R-0Normal           DO Eve Boyce DO  01/11/22 [id number]

## 2022-01-05 LAB
ALBUMIN SERPL-MCNC: 3.6 G/DL (ref 3.5–5.2)
ALP BLD-CCNC: 44 U/L (ref 35–104)
ALT SERPL-CCNC: 11 U/L (ref 0–32)
ANION GAP SERPL CALCULATED.3IONS-SCNC: 16 MMOL/L (ref 7–16)
AST SERPL-CCNC: 40 U/L (ref 0–31)
BASOPHILS ABSOLUTE: 0.01 E9/L (ref 0–0.2)
BASOPHILS RELATIVE PERCENT: 0.3 % (ref 0–2)
BILIRUB SERPL-MCNC: 0.2 MG/DL (ref 0–1.2)
BUN BLDV-MCNC: 24 MG/DL (ref 6–23)
C-REACTIVE PROTEIN: 7 MG/DL (ref 0–0.4)
CALCIUM SERPL-MCNC: 9.2 MG/DL (ref 8.6–10.2)
CHLORIDE BLD-SCNC: 107 MMOL/L (ref 98–107)
CO2: 19 MMOL/L (ref 22–29)
CREAT SERPL-MCNC: 1 MG/DL (ref 0.5–1)
D DIMER: 501 NG/ML DDU
EKG ATRIAL RATE: 88 BPM
EKG P AXIS: 42 DEGREES
EKG P-R INTERVAL: 154 MS
EKG Q-T INTERVAL: 376 MS
EKG QRS DURATION: 76 MS
EKG QTC CALCULATION (BAZETT): 454 MS
EKG R AXIS: 5 DEGREES
EKG T AXIS: 5 DEGREES
EKG VENTRICULAR RATE: 88 BPM
EOSINOPHILS ABSOLUTE: 0 E9/L (ref 0.05–0.5)
EOSINOPHILS RELATIVE PERCENT: 0 % (ref 0–6)
GFR AFRICAN AMERICAN: >60
GFR NON-AFRICAN AMERICAN: 53 ML/MIN/1.73
GLUCOSE BLD-MCNC: 106 MG/DL (ref 74–99)
HCT VFR BLD CALC: 32.7 % (ref 34–48)
HEMOGLOBIN: 10.6 G/DL (ref 11.5–15.5)
IMMATURE GRANULOCYTES #: 0.04 E9/L
IMMATURE GRANULOCYTES %: 1.3 % (ref 0–5)
L. PNEUMOPHILA SEROGP 1 UR AG: NORMAL
LYMPHOCYTES ABSOLUTE: 1.18 E9/L (ref 1.5–4)
LYMPHOCYTES RELATIVE PERCENT: 37.6 % (ref 20–42)
MAGNESIUM: 1.9 MG/DL (ref 1.6–2.6)
MCH RBC QN AUTO: 30.5 PG (ref 26–35)
MCHC RBC AUTO-ENTMCNC: 32.4 % (ref 32–34.5)
MCV RBC AUTO: 94 FL (ref 80–99.9)
MONOCYTES ABSOLUTE: 0.19 E9/L (ref 0.1–0.95)
MONOCYTES RELATIVE PERCENT: 6.1 % (ref 2–12)
NEUTROPHILS ABSOLUTE: 1.72 E9/L (ref 1.8–7.3)
NEUTROPHILS RELATIVE PERCENT: 54.7 % (ref 43–80)
PDW BLD-RTO: 13.5 FL (ref 11.5–15)
PLATELET # BLD: 67 E9/L (ref 130–450)
PLATELET CONFIRMATION: NORMAL
PMV BLD AUTO: 12.9 FL (ref 7–12)
POTASSIUM REFLEX MAGNESIUM: 3.4 MMOL/L (ref 3.5–5)
RBC # BLD: 3.48 E12/L (ref 3.5–5.5)
SODIUM BLD-SCNC: 142 MMOL/L (ref 132–146)
STREP PNEUMONIAE ANTIGEN, URINE: NORMAL
TOTAL PROTEIN: 7 G/DL (ref 6.4–8.3)
VITAMIN D 25-HYDROXY: 29 NG/ML (ref 30–100)
WBC # BLD: 3.1 E9/L (ref 4.5–11.5)

## 2022-01-05 PROCEDURE — 6360000002 HC RX W HCPCS: Performed by: INTERNAL MEDICINE

## 2022-01-05 PROCEDURE — 36415 COLL VENOUS BLD VENIPUNCTURE: CPT

## 2022-01-05 PROCEDURE — 2140000000 HC CCU INTERMEDIATE R&B

## 2022-01-05 PROCEDURE — 6370000000 HC RX 637 (ALT 250 FOR IP): Performed by: INTERNAL MEDICINE

## 2022-01-05 PROCEDURE — 80053 COMPREHEN METABOLIC PANEL: CPT

## 2022-01-05 PROCEDURE — 83735 ASSAY OF MAGNESIUM: CPT

## 2022-01-05 PROCEDURE — 6370000000 HC RX 637 (ALT 250 FOR IP): Performed by: FAMILY MEDICINE

## 2022-01-05 PROCEDURE — 86140 C-REACTIVE PROTEIN: CPT

## 2022-01-05 PROCEDURE — 2580000003 HC RX 258: Performed by: INTERNAL MEDICINE

## 2022-01-05 PROCEDURE — 85025 COMPLETE CBC W/AUTO DIFF WBC: CPT

## 2022-01-05 PROCEDURE — 2580000003 HC RX 258: Performed by: EMERGENCY MEDICINE

## 2022-01-05 PROCEDURE — 2500000003 HC RX 250 WO HCPCS: Performed by: EMERGENCY MEDICINE

## 2022-01-05 PROCEDURE — 82306 VITAMIN D 25 HYDROXY: CPT

## 2022-01-05 PROCEDURE — 85378 FIBRIN DEGRADE SEMIQUANT: CPT

## 2022-01-05 RX ORDER — ERGOCALCIFEROL 1.25 MG/1
50000 CAPSULE ORAL ONCE
Status: COMPLETED | OUTPATIENT
Start: 2022-01-05 | End: 2022-01-05

## 2022-01-05 RX ORDER — POTASSIUM CHLORIDE 20 MEQ/1
40 TABLET, EXTENDED RELEASE ORAL ONCE
Status: COMPLETED | OUTPATIENT
Start: 2022-01-05 | End: 2022-01-05

## 2022-01-05 RX ORDER — SODIUM CHLORIDE 450 MG/100ML
INJECTION, SOLUTION INTRAVENOUS CONTINUOUS
Status: DISCONTINUED | OUTPATIENT
Start: 2022-01-05 | End: 2022-01-08

## 2022-01-05 RX ORDER — GUAIFENESIN/DEXTROMETHORPHAN 100-10MG/5
5 SYRUP ORAL EVERY 4 HOURS PRN
Status: DISCONTINUED | OUTPATIENT
Start: 2022-01-05 | End: 2022-01-08 | Stop reason: HOSPADM

## 2022-01-05 RX ADMIN — LEVOTHYROXINE SODIUM 25 MCG: 0.03 TABLET ORAL at 06:39

## 2022-01-05 RX ADMIN — GUAIFENESIN SYRUP AND DEXTROMETHORPHAN 5 ML: 100; 10 SYRUP ORAL at 00:33

## 2022-01-05 RX ADMIN — POTASSIUM CHLORIDE 40 MEQ: 1500 TABLET, EXTENDED RELEASE ORAL at 10:08

## 2022-01-05 RX ADMIN — GUAIFENESIN SYRUP AND DEXTROMETHORPHAN 5 ML: 100; 10 SYRUP ORAL at 09:56

## 2022-01-05 RX ADMIN — SODIUM CHLORIDE: 9 INJECTION, SOLUTION INTRAVENOUS at 10:09

## 2022-01-05 RX ADMIN — ERGOCALCIFEROL 50000 UNITS: 1.25 CAPSULE, LIQUID FILLED ORAL at 10:09

## 2022-01-05 RX ADMIN — ISOSORBIDE MONONITRATE 30 MG: 30 TABLET, EXTENDED RELEASE ORAL at 09:54

## 2022-01-05 RX ADMIN — DOXYCYCLINE 100 MG: 100 INJECTION, POWDER, LYOPHILIZED, FOR SOLUTION INTRAVENOUS at 02:00

## 2022-01-05 RX ADMIN — CALCIUM CARBONATE-VITAMIN D TAB 500 MG-200 UNIT 1 TABLET: 500-200 TAB at 10:08

## 2022-01-05 RX ADMIN — CARVEDILOL 25 MG: 25 TABLET, FILM COATED ORAL at 09:55

## 2022-01-05 RX ADMIN — Medication 10 ML: at 21:53

## 2022-01-05 RX ADMIN — ACETAMINOPHEN 650 MG: 325 TABLET ORAL at 09:53

## 2022-01-05 RX ADMIN — SODIUM CHLORIDE: 4.5 INJECTION, SOLUTION INTRAVENOUS at 12:11

## 2022-01-05 RX ADMIN — HEPARIN SODIUM 5000 UNITS: 10000 INJECTION INTRAVENOUS; SUBCUTANEOUS at 06:10

## 2022-01-05 RX ADMIN — MIRTAZAPINE 15 MG: 15 TABLET, FILM COATED ORAL at 20:46

## 2022-01-05 RX ADMIN — BISMUTH SUBSALICYLATE 30 ML: 525 LIQUID ORAL at 12:12

## 2022-01-05 RX ADMIN — DOXYCYCLINE 100 MG: 100 INJECTION, POWDER, LYOPHILIZED, FOR SOLUTION INTRAVENOUS at 15:16

## 2022-01-05 RX ADMIN — HEPARIN SODIUM 5000 UNITS: 10000 INJECTION INTRAVENOUS; SUBCUTANEOUS at 14:30

## 2022-01-05 RX ADMIN — Medication 10 ML: at 09:56

## 2022-01-05 RX ADMIN — CARVEDILOL 25 MG: 25 TABLET, FILM COATED ORAL at 17:07

## 2022-01-05 RX ADMIN — ASPIRIN 81 MG: 81 TABLET, COATED ORAL at 09:55

## 2022-01-05 RX ADMIN — HEPARIN SODIUM 5000 UNITS: 10000 INJECTION INTRAVENOUS; SUBCUTANEOUS at 22:00

## 2022-01-05 ASSESSMENT — PAIN DESCRIPTION - LOCATION
LOCATION: BACK;HEAD
LOCATION: BACK

## 2022-01-05 ASSESSMENT — PAIN DESCRIPTION - DESCRIPTORS
DESCRIPTORS: ACHING
DESCRIPTORS: ACHING;DISCOMFORT;CONSTANT

## 2022-01-05 ASSESSMENT — PAIN SCALES - GENERAL
PAINLEVEL_OUTOF10: 3
PAINLEVEL_OUTOF10: 0
PAINLEVEL_OUTOF10: 3
PAINLEVEL_OUTOF10: 4

## 2022-01-05 ASSESSMENT — PAIN DESCRIPTION - PAIN TYPE
TYPE: ACUTE PAIN
TYPE: ACUTE PAIN

## 2022-01-05 ASSESSMENT — PAIN DESCRIPTION - ONSET: ONSET: ON-GOING

## 2022-01-05 ASSESSMENT — PAIN SCALES - WONG BAKER: WONGBAKER_NUMERICALRESPONSE: 0

## 2022-01-05 ASSESSMENT — PAIN DESCRIPTION - PROGRESSION: CLINICAL_PROGRESSION: GRADUALLY WORSENING

## 2022-01-05 ASSESSMENT — PAIN - FUNCTIONAL ASSESSMENT: PAIN_FUNCTIONAL_ASSESSMENT: ACTIVITIES ARE NOT PREVENTED

## 2022-01-05 ASSESSMENT — PAIN DESCRIPTION - FREQUENCY: FREQUENCY: CONTINUOUS

## 2022-01-05 NOTE — PROGRESS NOTES
Hospitalist Progress Note      SYNOPSIS: Patient admitted on 2022 for Pneumonia due to COVID-19 virus      SUBJECTIVE: Has not required supplement O2  Has had some diarrhea  5 loose stools since yesterday  Patient states that  She was using a cane at home to help her walker she has been falling repeatedly over the last 2 or 3 days at home    Review of systems  General constitutional/= feels weak  Pulmonary= has not required supplemental O2  GI= as per HPI  Genitourinary= patient is making urine     Mixed in with stool      Records reviewed. Temp (24hrs), Av.1 °F (36.7 °C), Min:97.4 °F (36.3 °C), Max:98.6 °F (37 °C)    DIET: ADULT DIET;  Regular  CODE: Full Code  No intake or output data in the 24 hours ending 22 0945    OBJECTIVE:05% room air    BP (!) 165/72   Pulse 88   Temp 98.6 °F (37 °C) (Oral)   Resp 18   Ht 5' 1\" (1.549 m)   Wt 180 lb (81.6 kg)   SpO2 95%   BMI 34.01 kg/m²     General appearance/constitutional: Elderly not diaphoretic not in extremis     Respiratory: Clear sounds to auscultation without wheezing  Cardiovascular:s1 s2 audible    Abdomen: No rigidity   Neurologic: Awake and alert speech is clear    ASSESSMENT:    COVID-19 +  Vaccinated patient    Has not required supplemental oxygen support  GI symptoms of COVID-19     Diarrhea  NAG metabolic acidosis        Secondary to   GI process         slight improvement  Elevated procalcitonin level/mildly elevated           Hypokalemia multifactorial   Secondary to GI losses         Dehydration secondary to diarrhea      Improved  Hypovolemia secondary to dehydration     Volume appears to have restored      This patient is making urine        And serum creatinine has normalized  Acute kidney injury secondary to hypovolemia -resolved    Weakness generalized     Likely secondary to COVID-19      And dehydration  Mild vitamin D deficiency          PLAN: Per pharmacy patient does not meet criteria for treatment with COVID-19 protocols    Pepto-Bismol for diarrhea  Discontinue ceftriaxone iv     Continue doxycycline     Follow-up urine Legionella and pneumococcal antigens decrease IV rate in place with half-normal saline  Potassium replacement  PT OT evaluation    Continue vitamin D supplement calcium cholecalciferol  And add 1 dose of ergocalciferol  DISPOSITION: Patient expected to go home at the conclusion of hospital stay    Medications:  REVIEWED DAILY    Infusion Medications    sodium chloride      sodium chloride 75 mL/hr at 01/04/22 2011     Scheduled Medications    cefTRIAXone (ROCEPHIN) IV  1,000 mg IntraVENous Q24H    doxycycline (VIBRAMYCIN) IV  100 mg IntraVENous Q12H    aspirin  81 mg Oral Daily    calcium-cholecalciferol  1 tablet Oral Daily    carvedilol  25 mg Oral BID WC    isosorbide mononitrate  30 mg Oral Daily    levothyroxine  25 mcg Oral Daily    mirtazapine  15 mg Oral Nightly    sodium chloride flush  5-40 mL IntraVENous 2 times per day    heparin (porcine)  5,000 Units SubCUTAneous 3 times per day     PRN Meds: guaiFENesin-dextromethorphan, sodium chloride flush, sodium chloride, acetaminophen **OR** acetaminophen, magnesium hydroxide    Labs:     Recent Labs     01/04/22  1221 01/05/22  0615   WBC 3.2* 3.1*   HGB 10.8* 10.6*   HCT 32.9* 32.7*   PLT 65* 67*       Recent Labs     01/04/22  1221 01/05/22  0615    142   K 3.8 3.4*    107   CO2 17* 19*   BUN 45* 24*   CREATININE 2.0* 1.0   CALCIUM 9.5 9.2       Recent Labs     01/05/22  0615   PROT 7.0   ALKPHOS 44   ALT 11   AST 40*   BILITOT 0.2           Chronic labs:    Lab Results   Component Value Date    INR 1.5 04/08/2015       Radiology:   +++++++++++++++++++++++++++++++++++++++++++++++++  DO Leroy Bañuelos Physician - [address] [place] [address]  +++++++++++++++++++++++++++++++++++++++++++++++++  NOTE: This report was transcribed using voice recognition software.  Every effort was made to ensure accuracy; however, inadvertent computerized transcription errors may be present.

## 2022-01-06 PROCEDURE — 6370000000 HC RX 637 (ALT 250 FOR IP): Performed by: EMERGENCY MEDICINE

## 2022-01-06 PROCEDURE — 2580000003 HC RX 258: Performed by: EMERGENCY MEDICINE

## 2022-01-06 PROCEDURE — 2580000003 HC RX 258: Performed by: INTERNAL MEDICINE

## 2022-01-06 PROCEDURE — 97530 THERAPEUTIC ACTIVITIES: CPT

## 2022-01-06 PROCEDURE — 97535 SELF CARE MNGMENT TRAINING: CPT

## 2022-01-06 PROCEDURE — 97161 PT EVAL LOW COMPLEX 20 MIN: CPT

## 2022-01-06 PROCEDURE — 97165 OT EVAL LOW COMPLEX 30 MIN: CPT

## 2022-01-06 PROCEDURE — 6360000002 HC RX W HCPCS: Performed by: INTERNAL MEDICINE

## 2022-01-06 PROCEDURE — 6370000000 HC RX 637 (ALT 250 FOR IP): Performed by: INTERNAL MEDICINE

## 2022-01-06 PROCEDURE — 2500000003 HC RX 250 WO HCPCS: Performed by: EMERGENCY MEDICINE

## 2022-01-06 PROCEDURE — 2140000000 HC CCU INTERMEDIATE R&B

## 2022-01-06 RX ORDER — DOXYCYCLINE HYCLATE 100 MG/1
100 CAPSULE ORAL EVERY 12 HOURS SCHEDULED
Status: DISCONTINUED | OUTPATIENT
Start: 2022-01-06 | End: 2022-01-08 | Stop reason: HOSPADM

## 2022-01-06 RX ADMIN — HEPARIN SODIUM 5000 UNITS: 10000 INJECTION INTRAVENOUS; SUBCUTANEOUS at 21:45

## 2022-01-06 RX ADMIN — DOXYCYCLINE HYCLATE 100 MG: 100 CAPSULE ORAL at 14:48

## 2022-01-06 RX ADMIN — ISOSORBIDE MONONITRATE 30 MG: 30 TABLET, EXTENDED RELEASE ORAL at 10:05

## 2022-01-06 RX ADMIN — CARVEDILOL 25 MG: 25 TABLET, FILM COATED ORAL at 10:05

## 2022-01-06 RX ADMIN — Medication 10 ML: at 10:12

## 2022-01-06 RX ADMIN — ACETAMINOPHEN 650 MG: 325 TABLET ORAL at 13:01

## 2022-01-06 RX ADMIN — DOXYCYCLINE 100 MG: 100 INJECTION, POWDER, LYOPHILIZED, FOR SOLUTION INTRAVENOUS at 02:16

## 2022-01-06 RX ADMIN — ASPIRIN 81 MG: 81 TABLET, COATED ORAL at 10:05

## 2022-01-06 RX ADMIN — CARVEDILOL 25 MG: 25 TABLET, FILM COATED ORAL at 17:34

## 2022-01-06 RX ADMIN — ACETAMINOPHEN 650 MG: 325 TABLET ORAL at 20:29

## 2022-01-06 RX ADMIN — LEVOTHYROXINE SODIUM 25 MCG: 0.03 TABLET ORAL at 06:07

## 2022-01-06 RX ADMIN — MIRTAZAPINE 15 MG: 15 TABLET, FILM COATED ORAL at 20:22

## 2022-01-06 ASSESSMENT — PAIN SCALES - GENERAL
PAINLEVEL_OUTOF10: 8
PAINLEVEL_OUTOF10: 8
PAINLEVEL_OUTOF10: 4
PAINLEVEL_OUTOF10: 0
PAINLEVEL_OUTOF10: 0

## 2022-01-06 ASSESSMENT — PAIN DESCRIPTION - LOCATION: LOCATION: GENERALIZED

## 2022-01-06 ASSESSMENT — PAIN DESCRIPTION - PAIN TYPE: TYPE: ACUTE PAIN

## 2022-01-06 ASSESSMENT — PAIN SCALES - WONG BAKER: WONGBAKER_NUMERICALRESPONSE: 0

## 2022-01-06 NOTE — PROGRESS NOTES
Notification of IV to PO conversion: This patient's order for doxycycline IV has been changed to PO as approved by the Pharmacy & Therapeutics and Medical Executive Committees. If the patient should become strict NPO while on this therapy, contact the prescriber for further orders.     Ray Murguia PharmD 1/6/2022 1:58 PM

## 2022-01-06 NOTE — PROGRESS NOTES
Hospitalist Progress Note      SYNOPSIS: Patient admitted on 2022 for Pneumonia due to COVID-19 virus      SUBJECTIVE:     Patient feels well  Denies any shortness of breath  Not needing any supplemental oxygen  Does complain of generalized weakness    Records reviewed. Temp (24hrs), Av °F (36.7 °C), Min:97.3 °F (36.3 °C), Max:98.3 °F (36.8 °C)    DIET: ADULT DIET;  Regular  CODE: Full Code    Intake/Output Summary (Last 24 hours) at 2022 1130  Last data filed at 2022 0830  Gross per 24 hour   Intake 1200.83 ml   Output 540 ml   Net 660.83 ml       OBJECTIVE:05% room air    BP (!) 152/91   Pulse 88   Temp 98.2 °F (36.8 °C) (Oral)   Resp 18   Ht 5' 1\" (1.549 m)   Wt 180 lb (81.6 kg)   SpO2 93%   BMI 34.01 kg/m²     General appearance/constitutional: Elderly not diaphoretic not in extremis     Respiratory: Clear sounds to auscultation without wheezing  Cardiovascular:s1 s2 audible    Abdomen: No rigidity   Neurologic: Awake and alert speech is clear    ASSESSMENT:    COVID-19 +  Vaccinated patient    Has not required supplemental oxygen support  GI symptoms of COVID-19     Diarrhea  CRP remains elevated at 7  Patient does have pancytopenia likely from Covid infection  Procalcitonin is 0.30             Hypokalemia multifactorial   Replace potassium     Dehydration secondary to diarrhea      Improved  Hypovolemia secondary to dehydration     Volume appears to have restored      This patient is making urine        And serum creatinine has normalized  Acute kidney injury secondary to hypovolemia -resolved  Renal function normalized    Weakness generalized     Likely secondary to COVID-19      And dehydration  Mild vitamin D deficiency    Pancytopenia  Likely from Covid infection  Monitor CBC          PLAN: Per pharmacy patient does not meet criteria for treatment with COVID-19 protocols    Pepto-Bismol for diarrhea  Discontinue ceftriaxone iv     Continue doxycycline     Follow-up urine Legionella and pneumococcal antigens decrease IV rate in place with half-normal saline  Potassium replacement  PT OT evaluation    Continue vitamin D supplement calcium cholecalciferol  And add 1 dose of ergocalciferol    DISPOSITION: Remains inpatient  Subacute rehab on discharge  We will order physical therapy occupational therapy to see    Medications:  REVIEWED DAILY    Infusion Medications    sodium chloride Stopped (01/06/22 1026)    sodium chloride       Scheduled Medications    doxycycline (VIBRAMYCIN) IV  100 mg IntraVENous Q12H    aspirin  81 mg Oral Daily    calcium-cholecalciferol  1 tablet Oral Daily    carvedilol  25 mg Oral BID WC    isosorbide mononitrate  30 mg Oral Daily    levothyroxine  25 mcg Oral Daily    mirtazapine  15 mg Oral Nightly    sodium chloride flush  5-40 mL IntraVENous 2 times per day    heparin (porcine)  5,000 Units SubCUTAneous 3 times per day     PRN Meds: guaiFENesin-dextromethorphan, bismuth subsalicylate, sodium chloride flush, sodium chloride, acetaminophen **OR** acetaminophen, magnesium hydroxide    Labs:     Recent Labs     01/04/22  1221 01/05/22  0615   WBC 3.2* 3.1*   HGB 10.8* 10.6*   HCT 32.9* 32.7*   PLT 65* 67*       Recent Labs     01/04/22  1221 01/05/22  0615    142   K 3.8 3.4*    107   CO2 17* 19*   BUN 45* 24*   CREATININE 2.0* 1.0   CALCIUM 9.5 9.2       Recent Labs     01/05/22  0615   PROT 7.0   ALKPHOS 44   ALT 11   AST 40*   BILITOT 0.2           Chronic labs:    Lab Results   Component Value Date    INR 1.5 04/08/2015       Radiology:   +++++++++++++++++++++++++++++++++++++++++++++++++  Jeannette Pradhan MD  [facility] Physician - [address] [place] [address]  +++++++++++++++++++++++++++++++++++++++++++++++++  NOTE: This report was transcribed using voice recognition software.  Every effort was made to ensure accuracy; however, inadvertent computerized transcription errors may be present.

## 2022-01-06 NOTE — PROGRESS NOTES
Occupational Therapy  OCCUPATIONAL THERAPY INITIAL EVALUATION    [address] [address] [address]      Date:2022                                                Patient Name: Arnie Sorensen  MRN: [id number]  : 1940  Room: [address]    Referring Provider: Carla Shaikh DO  Specific Provider Orders/Date: OT eval and treat 22    Diagnosis: Pneumonia of left lower lobe due to infectious organism [J18.9]  Pneumonia due to COVID-19 virus [U07.1, J12.82]   Pt admitted to hospital on 22 for weakness, SOB, fever and fall  +hit head. Head CT: (-)    Pertinent Medical History:  has a past medical history of ACL (anterior cruciate ligament) rupture, Acute ischemic colitis ([address] Anxiety, Arthritis, CAD (coronary artery disease), Cardiomyopathy ([address] Carotid artery stenosis, Chest pain, CHF (congestive heart failure) ([address] Diabetes mellitus ([address] Diabetes mellitus type 2, controlled ([address] Diabetic retinopathy ([address] Generalized osteoarthritis of multiple sites, Heart murmur, History of tobacco abuse, Hypercalcemia, Hypercholesterolemia, Hyperlipidemia, Hypertension, LV dysfunction, Mitral regurgitation, Obesity, Preoperative clearance, Rectal bleeding, Renal cyst, left, Tear meniscus knee, Tear meniscus knee, and Thyroid nodule.        Precautions:  Fall Risk, continuous pulse ox, droplet+ (Covid+)    Assessment of current deficits    [x] Functional mobility  [x]ADLs  [x] Strength               []Cognition    [x] Functional transfers   [x] IADLs         [x] Safety Awareness   [x]Endurance    [] Fine Coordination              [x] Balance      [] Vision/perception   []Sensation     []Gross Motor Coordination  [] ROM  [] Delirium                   [] Motor Control     OT PLAN OF CARE   OT POC based on physician orders, patient diagnosis and results of clinical assessment    Frequency/Duration 1-3 days/wk for 2 weeks PRN   Specific OT Treatment Interventions to include:   * Instruction/training on adapted ADL techniques and AE recommendations to increase functional independence within precautions       * Training on energy conservation strategies, correct breathing pattern and techniques to improve independence/tolerance for self-care routine  * Functional transfer/mobility training/DME recommendations for increased independence, safety, and fall prevention  * Patient/Family education to increase follow through with safety techniques and functional independence  * Therapeutic exercise to improve motor endurance, ROM, and functional strength for ADLs/functional transfers  * Therapeutic activities to facilitate/challenge dynamic balance, stand tolerance for increased safety and independence with ADLs  * Therapeutic activities to facilitate gross/fine motor skills for increased independence with ADLs    Recommended Adaptive Equipment: TBD     Home Living: Pt lives with spouse in 1 floor home. 0 SHIV  Bath, bedroom and laundry 1st floor   Bathroom setup: tub/shower with shower chair   Equipment owned: w/w, shower chair    Prior Level of Function: independent with ADLs , independent with IADLs; ambulated w/o AD   Driving: yes    Pain Level: Pt c/o no pain this session    Cognition: A&O: 4/4; Follows 1-2 step directions   Memory:  good    Sequencing:  good    Problem solving:  fair    Judgement/safety:  fair      Functional Assessment:  AM-PAC Daily Activity Raw Score: 19/24   Initial Eval Status  Date: 1/6/22 Treatment Status  Date: STGs = LTGs  Time frame: 10-14 days   Feeding Independent      Grooming Stand by Assist   Standing at sink  Modified [place]    UB Dressing Stand by Assist   Modified [place]    LB Dressing Minimal Assist   B socks  Modified [place]    Bathing Minimal Assist  Simulated  Modified [place]    Toileting Minimal Assist   Modified [place]    Bed Mobility  Supine to sit: Stand by Assist   Sit to supine: NT  Supine to sit: Modified [place]   Sit to supine: Modified [place]    Functional Transfers Stand by Assist   Various surfaces (EOB, chair, commode)  Modified [place]    Functional Mobility Contact Guard Assist w/ w/w  Room and bathroom  Modified [place]    Balance Sitting:     Static:  Supervision    Dynamic:SBA  Standing:  SBA w/ w/w (static)  CGA w/ w/w (dynamic)     Activity Tolerance Fair    Good   Visual/  Perceptual Glasses: only when driving                  Hand Dominance R   AROM (PROM) Strength Additional Info:    RUE  WFL 4/5 good  and wfl FMC/dexterity noted during ADL tasks       LUE WFL 4/5 good  and wfl FMC/dexterity noted during ADL tasks       Hearing: WFL   Sensation:  No c/o numbness or tingling  Tone: WFL   Edema: none noted    Comments: Upon arrival patient lying in bed. Pt agreeable to OT session this date. Therapist educated pt on role of OT. At end of session, patient seated in chair with call light and phone within reach, all lines and tubes intact. Overall patient demonstrated decreased independence and safety during completion of ADL/functional transfer/mobility tasks. Pt would benefit from continued skilled OT to increase safety and independence with completion of ADL/IADL tasks for functional independence and quality of life.     Treatment: OT treatment provided this date includes: Facilitation of bed mobility (education/cues for body mechanics), unsupported sitting balance (addressing posture, weight shifting, dynamic reaching to prep for ADL's), functional transfers (various surfaces w/ education/cues for safety/hand placement), standing tolerance tasks (addressing posture, balance and activity tolerance while incorporating light functional reaching impacting ADLs and functional activity) and functional ambulation tasks with w/w (including to/ from bathroom and in preparation for item retrieval tasks w/ education/skilled cuing on hand placement, posture, body mechanics, energy conservation techniques and safety). Therapist facilitated self-care retraining: UB/LB self-care tasks (gown, socks), toileting task and standing grooming tasks while educating/cuing pt on modified techniques, posture, safety and energy conservation techniques. Skilled monitoring of HR, O2 sats and pts response to treatment. Vitals: At rest: O2 sat=95%, 87 bpm  Seated EOB: O2 sat=92-95%  Post ambulation (chair>commode): O2 sat=94%  End of session: O2 sat=95%    Rehab Potential: Good for established goals     Patient / Family Goal: to return home      Patient and/or family were instructed on functional diagnosis, prognosis/goals and OT plan of care. Demonstrated good understanding. Eval Complexity: Low    Time In: 13:18  Time Out: 13:50  Total Treatment Time: 15 minutes    Min Units   OT Eval Low 97165  X 1   OT Eval Medium 54385      OT Eval High 97696      OT Re-Eval [id number]       Therapeutic Ex 13882       Therapeutic Activities 49012  5  0   ADL/Self Care 11049  10  1   Orthotic Management 64347       Manual [id number]     Neuro Re-Ed [id number]       Non-Billable Time          Evaluation Time additionally includes thorough review of current medical information, gathering information on past medical history/social history and prior level of function, interpretation of standardized testing/informal observation of tasks, assessment of data and development of plan of care and goals.         BREANNE Shay/L #[id number]

## 2022-01-07 LAB
ANION GAP SERPL CALCULATED.3IONS-SCNC: 18 MMOL/L (ref 7–16)
BASOPHILS ABSOLUTE: 0 E9/L (ref 0–0.2)
BASOPHILS RELATIVE PERCENT: 0 % (ref 0–2)
BUN BLDV-MCNC: 9 MG/DL (ref 6–23)
CALCIUM SERPL-MCNC: 9.4 MG/DL (ref 8.6–10.2)
CHLORIDE BLD-SCNC: 109 MMOL/L (ref 98–107)
CO2: 18 MMOL/L (ref 22–29)
CREAT SERPL-MCNC: 0.7 MG/DL (ref 0.5–1)
EOSINOPHILS ABSOLUTE: 0 E9/L (ref 0.05–0.5)
EOSINOPHILS RELATIVE PERCENT: 0 % (ref 0–6)
GFR AFRICAN AMERICAN: >60
GFR NON-AFRICAN AMERICAN: >60 ML/MIN/1.73
GLUCOSE BLD-MCNC: 119 MG/DL (ref 74–99)
HCT VFR BLD CALC: 30.8 % (ref 34–48)
HEMOGLOBIN: 10.4 G/DL (ref 11.5–15.5)
LYMPHOCYTES ABSOLUTE: 1.01 E9/L (ref 1.5–4)
LYMPHOCYTES RELATIVE PERCENT: 35.6 % (ref 20–42)
MAGNESIUM: 1.8 MG/DL (ref 1.6–2.6)
MCH RBC QN AUTO: 30.2 PG (ref 26–35)
MCHC RBC AUTO-ENTMCNC: 33.8 % (ref 32–34.5)
MCV RBC AUTO: 89.5 FL (ref 80–99.9)
MONOCYTES ABSOLUTE: 0.17 E9/L (ref 0.1–0.95)
MONOCYTES RELATIVE PERCENT: 6.1 % (ref 2–12)
NEUTROPHILS ABSOLUTE: 1.62 E9/L (ref 1.8–7.3)
NEUTROPHILS RELATIVE PERCENT: 58.3 % (ref 43–80)
OVALOCYTES: ABNORMAL
PDW BLD-RTO: 13.4 FL (ref 11.5–15)
PLATELET # BLD: 83 E9/L (ref 130–450)
PLATELET CONFIRMATION: NORMAL
PMV BLD AUTO: 12.6 FL (ref 7–12)
POIKILOCYTES: ABNORMAL
POTASSIUM REFLEX MAGNESIUM: 3.2 MMOL/L (ref 3.5–5)
RBC # BLD: 3.44 E12/L (ref 3.5–5.5)
SODIUM BLD-SCNC: 145 MMOL/L (ref 132–146)
WBC # BLD: 2.8 E9/L (ref 4.5–11.5)

## 2022-01-07 PROCEDURE — 6370000000 HC RX 637 (ALT 250 FOR IP): Performed by: INTERNAL MEDICINE

## 2022-01-07 PROCEDURE — 2140000000 HC CCU INTERMEDIATE R&B

## 2022-01-07 PROCEDURE — 6360000002 HC RX W HCPCS: Performed by: INTERNAL MEDICINE

## 2022-01-07 PROCEDURE — 6370000000 HC RX 637 (ALT 250 FOR IP): Performed by: EMERGENCY MEDICINE

## 2022-01-07 PROCEDURE — 83735 ASSAY OF MAGNESIUM: CPT

## 2022-01-07 PROCEDURE — 85025 COMPLETE CBC W/AUTO DIFF WBC: CPT

## 2022-01-07 PROCEDURE — 2580000003 HC RX 258: Performed by: INTERNAL MEDICINE

## 2022-01-07 PROCEDURE — 36415 COLL VENOUS BLD VENIPUNCTURE: CPT

## 2022-01-07 PROCEDURE — 80048 BASIC METABOLIC PNL TOTAL CA: CPT

## 2022-01-07 RX ADMIN — Medication 10 ML: at 21:46

## 2022-01-07 RX ADMIN — CARVEDILOL 25 MG: 25 TABLET, FILM COATED ORAL at 09:04

## 2022-01-07 RX ADMIN — HEPARIN SODIUM 5000 UNITS: 10000 INJECTION INTRAVENOUS; SUBCUTANEOUS at 05:38

## 2022-01-07 RX ADMIN — CARVEDILOL 25 MG: 25 TABLET, FILM COATED ORAL at 16:34

## 2022-01-07 RX ADMIN — DOXYCYCLINE HYCLATE 100 MG: 100 CAPSULE ORAL at 09:04

## 2022-01-07 RX ADMIN — ACETAMINOPHEN 650 MG: 325 TABLET ORAL at 09:03

## 2022-01-07 RX ADMIN — MIRTAZAPINE 15 MG: 15 TABLET, FILM COATED ORAL at 21:46

## 2022-01-07 RX ADMIN — ASPIRIN 81 MG: 81 TABLET, COATED ORAL at 09:04

## 2022-01-07 RX ADMIN — ISOSORBIDE MONONITRATE 30 MG: 30 TABLET, EXTENDED RELEASE ORAL at 09:04

## 2022-01-07 RX ADMIN — HEPARIN SODIUM 5000 UNITS: 10000 INJECTION INTRAVENOUS; SUBCUTANEOUS at 16:38

## 2022-01-07 RX ADMIN — LEVOTHYROXINE SODIUM 25 MCG: 0.03 TABLET ORAL at 06:02

## 2022-01-07 RX ADMIN — HEPARIN SODIUM 5000 UNITS: 10000 INJECTION INTRAVENOUS; SUBCUTANEOUS at 21:46

## 2022-01-07 RX ADMIN — ACETAMINOPHEN 650 MG: 325 TABLET ORAL at 16:33

## 2022-01-07 RX ADMIN — DOXYCYCLINE HYCLATE 100 MG: 100 CAPSULE ORAL at 21:46

## 2022-01-07 RX ADMIN — Medication 10 ML: at 09:07

## 2022-01-07 ASSESSMENT — PAIN SCALES - GENERAL
PAINLEVEL_OUTOF10: 5
PAINLEVEL_OUTOF10: 3
PAINLEVEL_OUTOF10: 0

## 2022-01-07 NOTE — PROGRESS NOTES
Hospitalist Progress Note      SYNOPSIS: Patient admitted on 2022 for Pneumonia due to COVID-19 virus      SUBJECTIVE:   Patient feels well  Denies any shortness of breath  Not needing any supplemental oxygen  Does complain of generalized weakness    Records reviewed. Temp (24hrs), Av.1 °F (36.7 °C), Min:97.9 °F (36.6 °C), Max:98.6 °F (37 °C)    DIET: ADULT DIET;  Regular  CODE: Full Code    Intake/Output Summary (Last 24 hours) at 2022 0910  Last data filed at 2022  Gross per 24 hour   Intake 400 ml   Output --   Net 400 ml       OBJECTIVE:05% room air    BP (!) 163/83   Pulse 90   Temp 98.6 °F (37 °C) (Oral)   Resp 20   Ht 5' 1\" (1.549 m)   Wt 180 lb (81.6 kg)   SpO2 96%   BMI 34.01 kg/m²     General appearance/constitutional: Elderly not diaphoretic not in extremis     Respiratory: Clear sounds to auscultation without wheezing  Cardiovascular:s1 s2 audible    Abdomen: No rigidity   Neurologic: Awake and alert speech is clear    ASSESSMENT:    COVID-19 +  Vaccinated patient    Has not required supplemental oxygen support  GI symptoms of COVID-19  CRP remains elevated at 7  Patient does have pancytopenia likely from Covid infection currently stable  Procalcitonin is 0.30             Hypokalemia multifactorial   Replace potassium     Dehydration secondary to diarrhea      Improved  Hypovolemia secondary to dehydration     Volume appears to have restored      This patient is making urine        And serum creatinine has normalized  Acute kidney injury secondary to hypovolemia -resolved  Renal function normalized    Weakness generalized     Likely secondary to COVID-19      And dehydration  Mild vitamin D deficiency    Pancytopenia  Likely from Covid infection  Monitor CBC          PLAN: Per pharmacy patient does not meet criteria for treatment with COVID-19 protocols    Pepto-Bismol for diarrhea  Discontinue ceftriaxone iv     Continue doxycycline     Follow-up urine Legionella and pneumococcal antigens decrease IV rate in place with half-normal saline  Potassium replacement  PT OT evaluation    Continue vitamin D supplement calcium cholecalciferol  And add 1 dose of ergocalciferol    DISPOSITION: Remains inpatient  Subacute rehab on discharge  We will order physical therapy occupational therapy to see    Medications:  REVIEWED DAILY    Infusion Medications    sodium chloride Stopped (01/06/22 1026)    sodium chloride       Scheduled Medications    doxycycline hyclate  100 mg Oral 2 times per day    aspirin  81 mg Oral Daily    calcium-cholecalciferol  1 tablet Oral Daily    carvedilol  25 mg Oral BID WC    isosorbide mononitrate  30 mg Oral Daily    levothyroxine  25 mcg Oral Daily    mirtazapine  15 mg Oral Nightly    sodium chloride flush  5-40 mL IntraVENous 2 times per day    heparin (porcine)  5,000 Units SubCUTAneous 3 times per day     PRN Meds: guaiFENesin-dextromethorphan, bismuth subsalicylate, sodium chloride flush, sodium chloride, acetaminophen **OR** acetaminophen, magnesium hydroxide    Labs:     Recent Labs     01/04/22  1221 01/05/22  0615 01/07/22  0647   WBC 3.2* 3.1* 2.8*   HGB 10.8* 10.6* 10.4*   HCT 32.9* 32.7* 30.8*   PLT 65* 67* 83*       Recent Labs     01/04/22  1221 01/05/22  0615    142   K 3.8 3.4*    107   CO2 17* 19*   BUN 45* 24*   CREATININE 2.0* 1.0   CALCIUM 9.5 9.2       Recent Labs     01/05/22  0615   PROT 7.0   ALKPHOS 44   ALT 11   AST 40*   BILITOT 0.2           Chronic labs:    Lab Results   Component Value Date    INR 1.5 04/08/2015       Radiology:   +++++++++++++++++++++++++++++++++++++++++++++++++  Wellington Conley MD  [facility] Physician - [address] [place] [address], [place]  +++++++++++++++++++++++++++++++++++++++++++++++++  NOTE: This report was transcribed using voice recognition software.  Every effort was made to ensure accuracy; however, inadvertent computerized transcription errors may be present.

## 2022-01-07 NOTE — PROGRESS NOTES
Notified Wanda Mejia MD of patients most recent BP via perfect serve. States to monitor for now    Blood pressure (!) 182/65, pulse 93, temperature 97.9 °F (36.6 °C), temperature source Oral, resp. rate 20, height 5' 1\" (1.549 m), weight 180 lb (81.6 kg), SpO2 93 %.

## 2022-01-08 VITALS
BODY MASS INDEX: 33.99 KG/M2 | SYSTOLIC BLOOD PRESSURE: 161 MMHG | TEMPERATURE: 98.4 F | RESPIRATION RATE: 17 BRPM | OXYGEN SATURATION: 96 % | HEIGHT: 61 IN | HEART RATE: 92 BPM | DIASTOLIC BLOOD PRESSURE: 76 MMHG | WEIGHT: 180 LBS

## 2022-01-08 PROCEDURE — 6370000000 HC RX 637 (ALT 250 FOR IP): Performed by: INTERNAL MEDICINE

## 2022-01-08 PROCEDURE — 6370000000 HC RX 637 (ALT 250 FOR IP): Performed by: EMERGENCY MEDICINE

## 2022-01-08 PROCEDURE — 6360000002 HC RX W HCPCS: Performed by: INTERNAL MEDICINE

## 2022-01-08 RX ORDER — POTASSIUM CHLORIDE 20 MEQ/1
40 TABLET, EXTENDED RELEASE ORAL ONCE
Status: COMPLETED | OUTPATIENT
Start: 2022-01-08 | End: 2022-01-08

## 2022-01-08 RX ORDER — POTASSIUM CHLORIDE 20 MEQ/1
20 TABLET, EXTENDED RELEASE ORAL 2 TIMES DAILY WITH MEALS
Status: DISCONTINUED | OUTPATIENT
Start: 2022-01-08 | End: 2022-01-08 | Stop reason: HOSPADM

## 2022-01-08 RX ORDER — DOXYCYCLINE HYCLATE 100 MG/1
100 CAPSULE ORAL EVERY 12 HOURS SCHEDULED
Qty: 2 CAPSULE | Refills: 0 | Status: SHIPPED | OUTPATIENT
Start: 2022-01-08 | End: 2022-01-09

## 2022-01-08 RX ORDER — FUROSEMIDE 20 MG/1
20 TABLET ORAL DAILY
Qty: 60 TABLET | Refills: 3 | Status: SHIPPED | OUTPATIENT
Start: 2022-01-08

## 2022-01-08 RX ORDER — POTASSIUM CHLORIDE 20 MEQ/1
20 TABLET, EXTENDED RELEASE ORAL 2 TIMES DAILY WITH MEALS
Qty: 60 TABLET | Refills: 3 | Status: SHIPPED | OUTPATIENT
Start: 2022-01-08

## 2022-01-08 RX ADMIN — HEPARIN SODIUM 5000 UNITS: 10000 INJECTION INTRAVENOUS; SUBCUTANEOUS at 05:29

## 2022-01-08 RX ADMIN — POTASSIUM CHLORIDE 20 MEQ: 1500 TABLET, EXTENDED RELEASE ORAL at 10:37

## 2022-01-08 RX ADMIN — LEVOTHYROXINE SODIUM 25 MCG: 0.03 TABLET ORAL at 05:29

## 2022-01-08 RX ADMIN — POTASSIUM CHLORIDE 40 MEQ: 1500 TABLET, EXTENDED RELEASE ORAL at 10:36

## 2022-01-08 RX ADMIN — ISOSORBIDE MONONITRATE 30 MG: 30 TABLET, EXTENDED RELEASE ORAL at 09:08

## 2022-01-08 RX ADMIN — ASPIRIN 81 MG: 81 TABLET, COATED ORAL at 09:08

## 2022-01-08 RX ADMIN — ACETAMINOPHEN 650 MG: 325 TABLET ORAL at 05:34

## 2022-01-08 RX ADMIN — CARVEDILOL 25 MG: 25 TABLET, FILM COATED ORAL at 09:08

## 2022-01-08 RX ADMIN — CALCIUM CARBONATE-VITAMIN D TAB 500 MG-200 UNIT 1 TABLET: 500-200 TAB at 09:08

## 2022-01-08 RX ADMIN — DOXYCYCLINE HYCLATE 100 MG: 100 CAPSULE ORAL at 09:13

## 2022-01-08 ASSESSMENT — PAIN SCALES - GENERAL
PAINLEVEL_OUTOF10: 4
PAINLEVEL_OUTOF10: 0
PAINLEVEL_OUTOF10: 6

## 2022-01-08 NOTE — DISCHARGE SUMMARY
Discharge Summary    Wood Clement  :    MRN:  [id number]    Admit date:  2022  Discharge date:  2022 9:51 AM    Admitting Physician:  Abelardo Chilel DO    Discharge Diagnoses:    Pneumonia due to COVID-19 virus  Dehydration resulting in BRYAN  History of ischemic colitis  Thrombocytopenia  Pancytopenia  Patient Active Problem List    Diagnosis Date Noted    Pneumonia due to COVID-19 virus 2022    BRYAN (acute kidney injury) ([address] 2022    Ischemic cardiomyopathy 2021    Carotid artery bruit 2015    Chest pain 2015    Osteoarthritis of left knee 2015    CKD (chronic kidney disease) stage 3, GFR 30-59 ml/min ([address] 2014    S/P left colectomy 2014    S/P left hemicolectomy 2013    CAD (coronary artery disease) 2013    Percutaneous transluminal coronary angioplasty status 2013    H/O CHF 2013    HTN (hypertension) 2013    Hypercholesteremia 2013    Diabetes mellitus type 2, controlled ([address] 2013    Carotid arterial disease ([address] 2013    Obesity 2013    Arthritis 2013    History of anxiety 2013    Acute ischemic colitis ([address] 2012    Rectal bleeding 2012       Past Medical Hx :   Past Medical History:   Diagnosis Date    ACL (anterior cruciate ligament) rupture     Acute ischemic colitis ([address] 2012    Anxiety     Arthritis     CAD (coronary artery disease)     Cardiomyopathy ([address]     Carotid artery stenosis 2012     Carotid ultrasound showed 50-69% stenosis of the middle segment of the left internal carotid artery.     Chest pain 2015    CHF (congestive heart failure) ([address]     Diabetes mellitus ([address]     no meds    Diabetes mellitus type 2, controlled ([address] 2013    Diabetic retinopathy ([address]     Generalized osteoarthritis of multiple sites     Heart murmur     History of tobacco abuse     Hypercalcemia     Hypercholesterolemia     Hyperlipidemia     Hypertension     LV dysfunction     Severe.  Mitral regurgitation     mild    Obesity     Preoperative clearance 2-13-15    Cardiac clearance for OR 3-18-15 from Dr. Lisa Aguilar [facility] letters    Rectal bleeding 2012 Exploratory laparotomy, lysis of excessive adhesions, left hemicolectomy and intraoperative colonoscopy:  Findings consistnet with ischemic colitis, segmental, 50 cm from the anal verge. Pathology negative for malignancy.  Renal cyst, left     On ultrasound 2007. remains current    Tear meniscus knee 3/17/2011    Small medial meniscal tear and medial collateral ligament tear of right knee noted on MRI. S/P right knee arthroscopic surgery in 2011.  Tear meniscus knee     Thyroid nodule        Past Surgical Hx :   Past Surgical History:   Procedure Laterality Date    APPENDECTOMY      CARDIOVASCULAR STRESS TEST  2012  Lexiscan nuclear stress test done at [facility]. No evidence of scars or stress-induced ischemia with an EF of 71%.  CATARACT REMOVAL WITH IMPLANT  2009 Right.    2012 Left.   SECTION      x 2.    COLONOSCOPY  2013    CORONARY ANGIOPLASTY WITH STENT PLACEMENT      DIAGNOSTIC CARDIAC CATH LAB PROCEDURE  2007    Cath/angioplasty. Left main, no significant disease. LAD 80-90% proximal stenosis. LCX large codominant vessel with no significant disease. RCA moderate codominant vessel with  no significant disesae. MIldly dilated ventricle with severe generalized hypokinesis with an estimated EF of 20-35% with trace mitral regurg. Markedly elevated left ventricular end-diastolic pressure.  DIAGNOSTIC CARDIAC CATH LAB PROCEDURE  2007 continued    Systemic hypertension. Successful balloon angioplsaty with the deployment of drug-eluting coronary stent to the proximal LAD with very good results. Closure of the right femoral artery access site with AngioSeal device.  ECHO COMPL W DOP COLOR FLOW  4/24/2012     Normal LV size, wall thickness, wall motion and systolic function with an EF of more than 55% with stage I left ventricular diastolic dysfunction, normal right  ventricular size and function,, mildly sclerotic aortic valve and there wsa aortic root sclerosis/calcification.  EYE SURGERY Bilateral     cateract extraction    HYSTERECTOMY  1980.  JOINT REPLACEMENT Left 3-    left total knee arthroplasty    SUBTOTAL COLECTOMY   4/26/12    LEFT ADITI WITH L. O. A.    TONSILLECTOMY         Admission Condition:  poor    Discharged Condition:  good    Labs:  CBC:   Lab Results   Component Value Date    WBC 2.8 01/07/2022    RBC 3.44 01/07/2022    HGB 10.4 01/07/2022    HCT 30.8 01/07/2022    MCV 89.5 01/07/2022    MCH 30.2 01/07/2022    MCHC 33.8 01/07/2022    RDW 13.4 01/07/2022    PLT 83 01/07/2022    MPV 12.6 01/07/2022     CMP:    Lab Results   Component Value Date     01/07/2022    K 3.2 01/07/2022     01/07/2022    CO2 18 01/07/2022    BUN 9 01/07/2022    CREATININE 0.7 01/07/2022    GFRAA >60 01/07/2022    LABGLOM >60 01/07/2022    GLUCOSE 119 01/07/2022    GLUCOSE 133 05/02/2012    PROT 7.0 01/05/2022    LABALBU 3.6 01/05/2022    LABALBU 3.2 05/02/2012    CALCIUM 9.4 01/07/2022    BILITOT 0.2 01/05/2022    ALKPHOS 44 01/05/2022    AST 40 01/05/2022    ALT 11 01/05/2022        Radiology Results: CT Head WO Contrast    Result Date: 1/4/2022  EXAMINATION: CT OF THE HEAD WITHOUT CONTRAST  1/4/2022 3:57 pm TECHNIQUE: CT of the head was performed without the administration of intravenous contrast. Dose modulation, iterative reconstruction, and/or weight based adjustment of the mA/kV was utilized to reduce the radiation dose to as low as reasonably achievable.  COMPARISON: 02/01/2011 HISTORY: ORDERING SYSTEM PROVIDED HISTORY: fall, bruised head TECHNOLOGIST PROVIDED HISTORY: Reason for exam:->fall, bruised head Has a \"code stroke\" or \"stroke alert\" been called? ->No Decision Support Exception - unselect if not a suspected or confirmed emergency medical condition->Emergency Medical Condition (MA) What reading provider will be dictating this exam?->CRC FINDINGS: Normal ventricles. Cerebral cortical atrophy. The anterior wall of the right middle temporal fossa shows a stable semilunar shaped calcific density measuring 11 mm in maximal dimension and occupying the extra-axial space. A small calcified meningioma is possible and which is stable. No significant mass effect. No intracranial hemorrhage or acute parenchymal abnormality. The posterior fossa structures are unremarkable. No suspicious extra-axial fluid collection. The orbits show no acute disease. The calvarium appears intact. No fracture seen. The right mastoid is better pneumatized than the left, unchanged. 1.  No intracranial hemorrhage or acute intracranial disease identified. 2.  Chronic changes, as above. RECOMMENDATIONS: Unavailable     XR CHEST PORTABLE    Result Date: 1/4/2022  EXAMINATION: ONE XRAY VIEW OF THE CHEST 1/4/2022 12:36 pm COMPARISON: Chest radiograph April 18, 2017 HISTORY: ORDERING SYSTEM PROVIDED HISTORY: b/l rhonchi on exam TECHNOLOGIST PROVIDED HISTORY: Reason for exam:->b/l rhonchi on exam What reading provider will be dictating this exam?->CRC FINDINGS: There is increased prominence of the pulmonary vasculature. No pneumothorax. Small bilateral pleural effusions identified. The cardiomediastinal silhouette is enlarged. The osseous structures are without acute process. Moderate interstitial edema Cardiomegaly. Small bilateral pleural effusions.        Hospital Course:  Patient was admitted on 1/4/2022 for pneumonia due to COVID-19  She was evaluated and not deemed a treatment candidate per pharmacy  She was given supportive treatment as well as maintained on treatment for community-acquired pneumonia  She responded well had no oxygen requirement  Her platelets did drop to the 80s however she is on heparin as well as baby aspirin this will be discontinued  She was also mildly dehydrated and following volume restoration her potassium was marginally low and this was supplemented  She will resume her home medications  Follow-up with her PCP to have her potassium checked as well the CBC  Discharge Medications:      Medication List      START taking these medications    doxycycline hyclate 100 MG capsule  Commonly known as: VIBRAMYCIN  Take 1 capsule by mouth every 12 hours for 2 doses     potassium chloride 20 MEQ extended release tablet  Commonly known as: KLOR-CON M  Take 1 tablet by mouth 2 times daily (with meals)        CONTINUE taking these medications    aspirin 81 MG tablet     Calcium + D 600-200 MG-UNIT Tabs  Generic drug: calcium carbonate-vitamin D     carvedilol 25 MG tablet  Commonly known as: COREG     furosemide 20 MG tablet  Commonly known as: LASIX  Take 1 tablet by mouth daily     isosorbide mononitrate 30 MG extended release tablet  Commonly known as: IMDUR  Take 1 tablet by mouth daily. levothyroxine 25 MCG tablet  Commonly known as: SYNTHROID     mirtazapine 15 MG tablet  Commonly known as: REMERON  Take 1 tablet by mouth nightly.      nitroGLYCERIN 0.4 MG SL tablet  Commonly known as: Nitrostat  Place 1 tablet under the tongue every 5 minutes as needed for Chest pain     vitamin B-12 1000 MCG tablet  Commonly known as: CYANOCOBALAMIN     ZANTAC 75 PO        STOP taking these medications    lisinopril 20 MG tablet  Commonly known as: PRINIVIL;ZESTRIL           Where to Get Your Medications      These medications were sent to [address] [place] [address], [address] [place] [id number]    Phone: [phone]   · doxycycline hyclate 100 MG capsule  · furosemide 20 MG tablet  · potassium chloride 20 MEQ extended release tablet         Discharge Exam:   PHYSICAL EXAM    PATIENT ALERT, ORIENTED AND COGNIZANT. INTERACTION IS NORMAL AND APPROPRIATE    HEAD: HEAD APPEARS NORMAL WITH NO EVIDENCE OF MASSES OR NODES. NO OBVIOUS DEFORMITY OR SCARS NOTED     EYES: GLOBES APPEAR NORMAL AND SYMMETRIC. COLORATION APPEARS NORMAL WITH NO EVIDENCE OF ICTERUS OR ANEMIA. PUPILS ARE EQUAL. NOSE: EXTERNAL EXAM NORMAL WITHOUT EVIDENCE OF SKIN LESIONS, NO MASSES, NO  DEFORMITIES. NARES: APPEAR CLEAR AND PATENT WITHOUT LESIONS. MOUTH: LIPS ARE NORMAL. TONGUE APPEARS NORMAL WITHOUT EVIDENCE OF VISIBLE LESIONS, OR DEVIATION. NO EVIDENCE OF ORAL MUCOSAL LESIONS. THROAT IS CLEAR, NO VISIBLE MASSES. DENTITION: DENTITION REVIEWED-ORAL HYGIENE NOTED    NECK: TRACHEA IS MIDLINE WITHOUT ANY EVIDENCE OF MASSES. NO NODES OR ADENOPATHY NOTED. NO PRE OR POST AURICULAR OR SUBMANDIBULAR ADENOPATHY. CAROTID UPSTROKE IS NORMAL AND SYMMETRIC. NO BRUITS NOTED     LUNGS: AUSCULTATION OF THE LUNGS DEMONSTRATES ADEQUATE AND SYMMETRIC AIR ENTRY. BREATH SOUNDS NOTED AS NORMAL. NO EVIDENCE OF BRONCHOSPASM OR FOCAL ABNORMALITIES. CVS: HEART SOUNDS-S1 S2: NORMAL RATE AND REGULAR RHYTHM. NO RUBS OR GALLOPS NOTED. NO EVIDENCE/OR CLINICAL SIGNS OF CONGESTIVE HEART FAILURE. THERE IS NO/AN AUDIBLE  MURMUR:     THORACIC WALL EXTERNALLY NORMAL- SPINE AS VISUALIZED IS NORMAL. ABDOMEN: NORMAL ANATOMIC CONTOUR WITH SYMMETRY. ON PALPATION THERE IS NO EVIDENCE OF TENDERNESS OR MASSES. NO ORGANOMEGALY. BOWEL SOUNDS ARE NORMALLY ACTIVE. NO EVIDENCE OF BRUITS. EXTREMITIES: SKIN APPEARS NORMAL WITH NO EVIDENCE OF LESIONS. PERFUSION IS ADEQUATE AND PULSES ARE PALPABLE. THERE IS NO EVIDENCE OF STASIS. NAILS ARE CLEAR. NO OVERT MUSCLE ATROPHY. NEURO: CRANIAL NERVE EXAM AS TESTED APPEARS GROSSLY NORMAL. PERIPHERAL NERVE EXAM DEMONSTRATES NO DEFICITS OR ASYMMETRY IN STRENGTH, MOVEMENT OR REFLEXES. MALE: EXAM EXTERNAL ANATOMY APPEARS NORMAL: TESTICULAR AND PENILE EXAM WERE AGE APPRORIATE.  INGUINAL ORIFICES EXAMINED FOR HERNIA.     BALANCE AND GAIT WERE OBSERVED:    COGNITION WAS EVALUATED BY INTERACTION WITH THE PATIENT: PATIENT IS COGNIZANT AND CAPABLE OF SELF DIRECTING CARE      Disposition: home    Patient Instructions:   REFER TO AVR or [facility]O document    She will follow-up with her PCP shortly  Assessment of her CBC for the pancytopenia after the heparin has been stopped  Reassessment of her potassium which has been supplemented here and additional supplements given home when she resumes her furosemide    Signed:  Mary Bateman of Internal Medicine  [employer]  1/8/2022, 9:51 AM

## 2022-01-10 ENCOUNTER — CARE COORDINATION (OUTPATIENT)
Dept: CASE MANAGEMENT | Age: 82
End: 2022-01-10

## 2022-01-10 NOTE — CARE COORDINATION
COVID-19 Monitoring Initial Follow-up Note    Call within 2 business days of discharge: Yes. First attempt to reach the patient for COVID Monitoring (positive) Care Transition call post hospital discharge. Message left with CTN's contact information requesting return phone call.

## 2022-01-11 ENCOUNTER — CARE COORDINATION (OUTPATIENT)
Dept: CASE MANAGEMENT | Age: 82
End: 2022-01-11

## 2022-01-11 NOTE — CARE COORDINATION
[address] Transitions Initial Follow Up Call    Call within 2 business days of discharge: Yes    Patient: Yuvonne Leventhal Patient : 1940   MRN: [id number]  Reason for Admission: Pneumonia of left lower lobe due to infectious organism  Discharge Date: 22 RARS: Readmission Risk Score: 12.3 ( )      Last Discharge [facility]       Complaint Diagnosis Description Type Department Provider    22 Concern For COVID-19 Pneumonia of left lower lobe due to infectious organism ED to Hosp-Admission (Discharged) (ADMITTED) [id number] Arianna Diaz MD; Tamera Barnes. .. 2nd to reach the patient for initial [address] Transition call post hospital discharge. Message left with CTN's contact information requesting return phone call. No further outreaches will be attempted. If no return call by the end of today, CTN will sign off. Follow Up  No future appointments.     Jyoti Baca RN

## 2022-05-12 ENCOUNTER — OFFICE VISIT (OUTPATIENT)
Dept: CARDIOLOGY CLINIC | Age: 82
End: 2022-05-12
Payer: MEDICARE

## 2022-05-12 VITALS
DIASTOLIC BLOOD PRESSURE: 80 MMHG | HEART RATE: 86 BPM | BODY MASS INDEX: 32.28 KG/M2 | WEIGHT: 171 LBS | HEIGHT: 61 IN | RESPIRATION RATE: 18 BRPM | SYSTOLIC BLOOD PRESSURE: 132 MMHG

## 2022-05-12 DIAGNOSIS — I25.5 ISCHEMIC CARDIOMYOPATHY: ICD-10-CM

## 2022-05-12 DIAGNOSIS — E66.9 OBESITY (BMI 30.0-34.9): ICD-10-CM

## 2022-05-12 DIAGNOSIS — I25.10 CORONARY ARTERY DISEASE, UNSPECIFIED VESSEL OR LESION TYPE, UNSPECIFIED WHETHER ANGINA PRESENT, UNSPECIFIED WHETHER NATIVE OR TRANSPLANTED HEART: Primary | ICD-10-CM

## 2022-05-12 DIAGNOSIS — E78.49 OTHER HYPERLIPIDEMIA: ICD-10-CM

## 2022-05-12 DIAGNOSIS — L65.9 HAIR LOSS: ICD-10-CM

## 2022-05-12 DIAGNOSIS — I10 PRIMARY HYPERTENSION: ICD-10-CM

## 2022-05-12 PROCEDURE — 4040F PNEUMOC VAC/ADMIN/RCVD: CPT | Performed by: INTERNAL MEDICINE

## 2022-05-12 PROCEDURE — 93000 ELECTROCARDIOGRAM COMPLETE: CPT | Performed by: INTERNAL MEDICINE

## 2022-05-12 PROCEDURE — G8400 PT W/DXA NO RESULTS DOC: HCPCS | Performed by: INTERNAL MEDICINE

## 2022-05-12 PROCEDURE — 99214 OFFICE O/P EST MOD 30 MIN: CPT | Performed by: INTERNAL MEDICINE

## 2022-05-12 PROCEDURE — 1123F ACP DISCUSS/DSCN MKR DOCD: CPT | Performed by: INTERNAL MEDICINE

## 2022-05-12 PROCEDURE — 1036F TOBACCO NON-USER: CPT | Performed by: INTERNAL MEDICINE

## 2022-05-12 PROCEDURE — G8417 CALC BMI ABV UP PARAM F/U: HCPCS | Performed by: INTERNAL MEDICINE

## 2022-05-12 PROCEDURE — G8427 DOCREV CUR MEDS BY ELIG CLIN: HCPCS | Performed by: INTERNAL MEDICINE

## 2022-05-12 PROCEDURE — 1090F PRES/ABSN URINE INCON ASSESS: CPT | Performed by: INTERNAL MEDICINE

## 2022-05-12 RX ORDER — ROSUVASTATIN CALCIUM 5 MG
5 TABLET ORAL DAILY
Qty: 30 TABLET | Refills: 3
Start: 2022-05-12 | End: 2022-05-16 | Stop reason: SDUPTHER

## 2022-05-12 NOTE — PATIENT INSTRUCTIONS
 Continue all your medications at current doses.  Start taking crestor 5 mg daily.  We will get your recent lipid panel for review.  I will give you a handout for healthy diet   Restrict sodium intake to less than 2-2.5 g/day. Restrict fluid intake to less than 2.2 L/day. Goal BP is less than 130/80.  If your weight increases by > 2 lbs in a day or >5 lbs in more than a day, take an extra lasix pill.  Please try to exercise for 150 minutes a week.  I will see you back in the office in 6 months. Please call the office at ([phone], option [phone]) if you have any questions.

## 2022-05-12 NOTE — PROGRESS NOTES
CHIEF COMPLAINT:   Chief Complaint   Patient presents with    Coronary Artery Disease     6 mo ov -c/o losing hair, wants to know if it due to any of her meds. echo was never mary. HISTORY OF PRESENT ILLNESS: Patient is a 80 y.o. female seen at the request of Óscar Medina MD for a follow-up visit with me. She has a history of ischemic cardiomyopathy, with recovered ejection fraction, coronary artery disease status post PCI, hypertension, hyperlipidemia, moderate obesity. Since her last visit with me, she was admitted to the hospital for 4 days in January for COVID 19. She did not need intubation. Since discharge, she has been recovering well. Lisinopril was discontinued in the hospital for unclear reasons. She followed up with her PCP and her Crestor was discontinued as well. She has been complaining of hair fall in the last few months. At today's office visit, She denies any chest pain, shortness of breath, palpitations, dizziness, pedal edema. The patient is capable of activities of daily living. There is dyspnea on more than moderate exertion. There is no orthopnea or PND. She is compliant with medications as well as diet and exercise regimen. Prior Cardiac workup:  History of ischemic cardiomyopathy since 2007, with echocardiogram at      that time showing EF of 25%, and a cardiac catheterization in May 2007,      showing LAD stenosis; 80% to 90%; and an EF of 20% to 25%. No disease of      the circumflex or RCA. Had a drug-eluting stent to the LAD with last      Lexiscan nuclear stress test in June 2014, being nonischemic and      hyperdynamic left ventricular systolic function. Nuclear perfusion scan from 4/9/2015: No perfusion defects.     Past Medical History:   Diagnosis Date    ACL (anterior cruciate ligament) rupture     Acute ischemic colitis ([facility] [address] 4/30/2012    Anxiety     Arthritis     CAD (coronary artery disease)     Cardiomyopathy ([facility] [address]     Carotid artery stenosis 1/5/2012     Carotid ultrasound showed 50-69% stenosis of the middle segment of the left internal carotid artery.  Chest pain 4/8/2015    CHF [facility]estive heart failure) [facility] [address]     Diabetes mellitus ([facility] [address]     no meds    Diabetes mellitus type 2, controlled ([address] 1/18/2013    Diabetic retinopathy ([facility] [address]     Generalized osteoarthritis of multiple sites     Heart murmur     History of tobacco abuse     Hypercalcemia     Hypercholesterolemia     Hyperlipidemia     Hypertension     LV dysfunction     Severe.  Mitral regurgitation     mild    Obesity     Preoperative clearance 2-13-15    Cardiac clearance for OR 3-18-15 from Dr. Rosa Hankins in [facility] letters    Rectal bleeding 4/20/2012 4/26/2012 Exploratory laparotomy, lysis of excessive adhesions, left hemicolectomy and intraoperative colonoscopy:  Findings consistnet with ischemic colitis, segmental, 50 cm from the anal verge. Pathology negative for malignancy.  Renal cyst, left     On ultrasound 5/1/2007. remains current    Tear meniscus knee 3/17/2011    Small medial meniscal tear and medial collateral ligament tear of right knee noted on MRI. S/P right knee arthroscopic surgery in 6/2011.  Tear meniscus knee     Thyroid nodule        Allergies   Allergen Reactions    Meloxicam      Sensitivity. Muscle aches, fullness in chest.    Metformin And Related [Metformin And Related]      Caused metabolic acidosis. Current Outpatient Medications   Medication Sig Dispense Refill    CRESTOR 5 MG tablet Take 1 tablet by mouth daily 30 tablet 3    potassium chloride (KLOR-CON M) 20 MEQ extended release tablet Take 1 tablet by mouth 2 times daily (with meals) 60 tablet 3    furosemide (LASIX) 20 MG tablet Take 1 tablet by mouth daily 60 tablet 3    mirtazapine (REMERON) 15 MG tablet Take 1 tablet by mouth nightly.  (Patient taking differently: Take 15 mg by mouth as needed ) 30 tablet 3    carvedilol (COREG) 25 MG tablet Take 25 mg by mouth 2 times daily (with meals). Instructed to take with sip water am of procedure      Calcium Carbonate-Vitamin D (CALCIUM + D) 600-200 MG-UNIT TABS Take 1 tablet by mouth daily. LD 3/11/2015      levothyroxine (SYNTHROID) 25 MCG tablet Take 25 mcg by mouth Daily.  aspirin 81 MG tablet Take 81 mg by mouth daily. LD 3/11/2015      isosorbide mononitrate (IMDUR) 30 MG CR tablet Take 1 tablet by mouth daily. 30 tablet 12    nitroGLYCERIN (NITROSTAT) 0.4 MG SL tablet Place 1 tablet under the tongue every 5 minutes as needed for Chest pain 25 tablet 3    Ranitidine HCl (ZANTAC 75 PO) Take 1 tablet by mouth as needed. (Patient not taking: Reported on 2021)      vitamin B-12 (CYANOCOBALAMIN) 1000 MCG tablet Take 1,000 mcg by mouth daily. LD 3/11/2015 (Patient not taking: Reported on 2021)       No current facility-administered medications for this visit. Social History     Socioeconomic History    Marital status:      Spouse name: Not on file    Number of children: Not on file    Years of education: Not on file    Highest education level: Not on file   Occupational History    Not on file   Tobacco Use    Smoking status: Former Smoker     Packs/day: 1.00     Years: 12.00     Pack years: 12.00     Types: Cigarettes     Quit date: 1982     Years since quittin.2    Smokeless tobacco: Never Used    Tobacco comment:  exposed to second hand smoke from . Vaping Use    Vaping Use: Never used   Substance and Sexual Activity    Alcohol use:  Yes     Alcohol/week: 0.0 standard drinks     Comment: rare    Drug use: No    Sexual activity: Not on file   Other Topics Concern    Not on file   Social History Narrative    Not on file     Social Determinants of Health     Financial Resource Strain:     Difficulty of Paying Living Expenses: Not on file   Food Insecurity:     Worried About Running Out of Food in the Last Year: Not on file    [address] in the Last Year: Not on file   Transportation Needs:     Lack of Transportation (Medical): Not on file    Lack of Transportation (Non-Medical): Not on file   Physical Activity:     Days of Exercise per Week: Not on file    Minutes of Exercise per Session: Not on file   Stress:     Feeling of Stress : Not on file   Social Connections:     Frequency of Communication with Friends and Family: Not on file    Frequency of Social Gatherings with Friends and Family: Not on file    Attends Restoration[employer] Services: Not on file    Active Member of [address] [place] [address] [employer] or Organizations: Not on file    Attends Club or Organization Meetings: Not on file    Marital Status: Not on file   Intimate Partner Violence:     Fear of Current or Ex-Partner: Not on file    Emotionally Abused: Not on file    Physically Abused: Not on file    Sexually Abused: Not on file   Housing Stability:     Unable to Pay for Housing in the Last Year: Not on file    Number of [place] in the Last Year: Not on file    Unstable Housing in the Last Year: Not on file       No family history on file. Review of Systems  Constitutional: Negative for fever, malaise/fatigue and weight loss. HENT: Negative for sore throat and tinnitus. Eyes: Negative for blurred vision and double vision. Respiratory: Negative for shortness of breath. Negative for cough and wheezing. Cardiovascular: As mentioned in HPI. Gastrointestinal: Negative for abdominal pain, heartburn, nausea and vomiting. Genitourinary: Negative. Musculoskeletal: Negative for back pain, joint pain and myalgias. Neurological: Negative for dizziness, tremors, loss of consciousness and headaches. Endo/Heme/Allergies: Negative. Psychiatric/Behavioral: Negative for depression and suicidal ideas. Physical Exam   /80   Pulse 86   Resp 18   Ht 5' 1\" (1.549 m)   Wt 171 lb (77.6 kg)   BMI 32.31 kg/m²   Constitutional: Oriented to person, place, and time. Well-developed and well-nourished. No distress. Head: Normocephalic and atraumatic. Eyes: EOM are normal. Pupils are equal, round, and reactive to light. Neck: Normal range of motion. Neck supple. No hepatojugular reflux and no JVD present. Carotid bruit is not present. No tracheal deviation present. No thyromegaly present. Cardiovascular: Normal rate, regular rhythm, normal heart sounds and intact distal pulses. Exam reveals no gallop and no friction rub. No murmur heard. Pulmonary/Chest: Effort normal and breath sounds normal. No respiratory distress. No wheezes. No rales. No tenderness. Abdominal: Soft. Bowel sounds are normal. No distension and no mass. No tenderness. No rebound and no guarding. Musculoskeletal: Normal range of motion. No edema and no tenderness. Lymphadenopathy:   No cervical adenopathy. Neurological: Alert and oriented to person, place, and time. Skin: Skin is warm and dry. No rash noted. Not diaphoretic. No erythema. Psychiatric: Normal mood and affect. Behavior is normal.     Procedures and Testing  EKG: Done in the office today and reviewed by me. Normal sinus rhythm. No other abnormalities detected. ASSESSMENT:   Diagnosis Orders   1. Coronary artery disease, unspecified vessel or lesion type, unspecified whether angina present, unspecified whether native or transplanted heart  EKG 12 Lead   2. Primary hypertension     3. Other hyperlipidemia     4. Ischemic cardiomyopathy     5. Obesity (BMI 30.0-34.9)     6. Hair loss  [facility] - Elidia Noriega MD, Dermatology, [place]        Plan:  1. Coronary artery disease status post PCI: She is stable and asymptomatic. Continue with medical management. Continue aspirin 81, Coreg 25 twice daily and Imdur 30 mg daily. She had blood work done recently and I will have those faxed to my office for review. I will resume her Crestor 5 mg daily. 2.  Hypertension: Pressure is at goal in clinic today. Continue medications as above.   She is also on Lasix 20 mg daily.  She was on lisinopril which was discontinued in the hospital.  We will monitor pressures of lisinopril for now. Goal for her is less than 130/80. 3.  Ischemic cardiomyopathy with recovered ejection fraction: She appears to be stable and asymptomatic. Continue medications as above. We will check an echocardiogram to assess biventricular function and rule out any significant valvular abnormalities. 4.  Hyperlipidemia: As described above. Dietary counseling provided. I will have her recent blood work faxed to my office for review. 5.  Moderate obesity: Counseled for compliance with diet, exercise and weight loss. Follow-up in the office in 6 months. Mukul Cortez MD  [facility]) Cardiology     NOTE: This report was transcribed using voice recognition software. Every effort was made to ensure accuracy; however, inadvertent computerized transcription errors may be present.

## 2022-05-16 RX ORDER — ROSUVASTATIN CALCIUM 5 MG
5 TABLET ORAL DAILY
Qty: 30 TABLET | Refills: 3 | OUTPATIENT
Start: 2022-05-16 | End: 2022-09-15 | Stop reason: SDUPTHER

## 2022-09-15 RX ORDER — ROSUVASTATIN CALCIUM 5 MG
5 TABLET ORAL DAILY
Qty: 30 TABLET | Refills: 3 | Status: SHIPPED | OUTPATIENT
Start: 2022-09-15

## 2023-02-06 RX ORDER — ROSUVASTATIN CALCIUM 5 MG/1
5 TABLET, COATED ORAL DAILY
Qty: 30 TABLET | Refills: 5 | Status: SHIPPED | OUTPATIENT
Start: 2023-02-06

## 2023-03-08 ENCOUNTER — OFFICE VISIT (OUTPATIENT)
Dept: CARDIOLOGY CLINIC | Age: 83
End: 2023-03-08
Payer: MEDICARE

## 2023-03-08 VITALS
BODY MASS INDEX: 33.42 KG/M2 | RESPIRATION RATE: 18 BRPM | WEIGHT: 177 LBS | DIASTOLIC BLOOD PRESSURE: 92 MMHG | HEIGHT: 61 IN | SYSTOLIC BLOOD PRESSURE: 152 MMHG | HEART RATE: 86 BPM

## 2023-03-08 DIAGNOSIS — N18.31 STAGE 3A CHRONIC KIDNEY DISEASE (HCC): ICD-10-CM

## 2023-03-08 DIAGNOSIS — E11.9 CONTROLLED TYPE 2 DIABETES MELLITUS WITHOUT COMPLICATION, WITHOUT LONG-TERM CURRENT USE OF INSULIN (HCC): Chronic | ICD-10-CM

## 2023-03-08 DIAGNOSIS — I10 ESSENTIAL HYPERTENSION: ICD-10-CM

## 2023-03-08 DIAGNOSIS — I25.10 CORONARY ARTERY DISEASE, UNSPECIFIED VESSEL OR LESION TYPE, UNSPECIFIED WHETHER ANGINA PRESENT, UNSPECIFIED WHETHER NATIVE OR TRANSPLANTED HEART: Primary | ICD-10-CM

## 2023-03-08 DIAGNOSIS — I25.5 ISCHEMIC CARDIOMYOPATHY: ICD-10-CM

## 2023-03-08 DIAGNOSIS — E66.9 OBESITY (BMI 30.0-34.9): ICD-10-CM

## 2023-03-08 PROCEDURE — 1036F TOBACCO NON-USER: CPT | Performed by: INTERNAL MEDICINE

## 2023-03-08 PROCEDURE — G8484 FLU IMMUNIZE NO ADMIN: HCPCS | Performed by: INTERNAL MEDICINE

## 2023-03-08 PROCEDURE — 1090F PRES/ABSN URINE INCON ASSESS: CPT | Performed by: INTERNAL MEDICINE

## 2023-03-08 PROCEDURE — 93000 ELECTROCARDIOGRAM COMPLETE: CPT | Performed by: INTERNAL MEDICINE

## 2023-03-08 PROCEDURE — G8417 CALC BMI ABV UP PARAM F/U: HCPCS | Performed by: INTERNAL MEDICINE

## 2023-03-08 PROCEDURE — 99214 OFFICE O/P EST MOD 30 MIN: CPT | Performed by: INTERNAL MEDICINE

## 2023-03-08 PROCEDURE — 3080F DIAST BP >= 90 MM HG: CPT | Performed by: INTERNAL MEDICINE

## 2023-03-08 PROCEDURE — G8427 DOCREV CUR MEDS BY ELIG CLIN: HCPCS | Performed by: INTERNAL MEDICINE

## 2023-03-08 PROCEDURE — G8400 PT W/DXA NO RESULTS DOC: HCPCS | Performed by: INTERNAL MEDICINE

## 2023-03-08 PROCEDURE — 3077F SYST BP >= 140 MM HG: CPT | Performed by: INTERNAL MEDICINE

## 2023-03-08 PROCEDURE — 1123F ACP DISCUSS/DSCN MKR DOCD: CPT | Performed by: INTERNAL MEDICINE

## 2023-03-08 NOTE — PATIENT INSTRUCTIONS
Continue all your medications at current doses. We will schedule an echocardiogram.   Please call next week with a BP log. Restrict sodium intake to less than 2-2.5 g/day. Restrict fluid intake to less than 2.2 L/day. Goal BP is less than 130/80. Please try to exercise for 150 minutes a week. I will see you back in the office in 6 months. Please call the office at ([phone], option [phone]) if you have any questions.

## 2023-03-08 NOTE — PROGRESS NOTES
CHIEF COMPLAINT:   Chief Complaint   Patient presents with    6 Month Follow-Up        HISTORY OF PRESENT ILLNESS: Patient is a 80 y.o. female seen at the request of Warnell Eisenmenger, MD for a follow-up visit with me. She has a history of ischemic cardiomyopathy, with recovered ejection fraction, coronary artery disease status post PCI, hypertension, hyperlipidemia, moderate obesity. She also had COVID-19 in January 2022. She was admitted to the hospital for a period of 4 days. Since discharge, she has been recovering well. Lisinopril was discontinued in the hospital likely for cough. She denies any complaints today. Her pressures are elevated in the office and she took her medications an hour prior to her visit. She does not check her pressures at home although she does have a blood pressure cuff. At today's office visit, She denies any chest pain, shortness of breath, palpitations, dizziness, pedal edema. The patient is capable of activities of daily living. There is dyspnea on more than moderate exertion. There is no orthopnea or PND. She is compliant with medications as well as diet and exercise regimen. Prior Cardiac workup:  History of ischemic cardiomyopathy since 2007, with echocardiogram at      that time showing EF of 25%, and a cardiac catheterization in May 2007,      showing LAD stenosis; 80% to 90%; and an EF of 20% to 25%. No disease of      the circumflex or RCA. Had a drug-eluting stent to the LAD with last      Lexiscan nuclear stress test in June 2014, being nonischemic and      hyperdynamic left ventricular systolic function. Nuclear perfusion scan from 4/9/2015: No perfusion defects.     Past Medical History:   Diagnosis Date    ACL (anterior cruciate ligament) rupture     Acute ischemic colitis ([facility] [address] 4/30/2012    Anxiety     Arthritis     CAD (coronary artery disease)     Cardiomyopathy ([facility] [address]     Carotid artery stenosis 1/5/2012     Carotid ultrasound showed 50-69% stenosis of the middle segment of the left internal carotid artery. Chest pain 4/8/2015    CHF (congestive heart failure) ([facility] [address]     Diabetes mellitus ([facility] [address]     no meds    Diabetes mellitus type 2, controlled ([facility] [address] 1/18/2013    Diabetic retinopathy (HCC)     Generalized osteoarthritis of multiple sites     Heart murmur     History of tobacco abuse     Hypercalcemia     Hypercholesterolemia     Hyperlipidemia     Hypertension     LV dysfunction     Severe. Mitral regurgitation     mild    Obesity     Preoperative clearance 2-13-15    Cardiac clearance for OR 3-18-15 from Dr. Diego Sherman in [facility] letters    Rectal bleeding 4/20/2012 4/26/2012 Exploratory laparotomy, lysis of excessive adhesions, left hemicolectomy and intraoperative colonoscopy:  Findings consistnet with ischemic colitis, segmental, 50 cm from the anal verge. Pathology negative for malignancy. Renal cyst, left     On ultrasound 5/1/2007. remains current    Tear meniscus knee 3/17/2011    Small medial meniscal tear and medial collateral ligament tear of right knee noted on MRI. S/P right knee arthroscopic surgery in 6/2011. Tear meniscus knee     Thyroid nodule        Allergies   Allergen Reactions    Meloxicam      Sensitivity. Muscle aches, fullness in chest.    Metformin And Related [Metformin And Related]      Caused metabolic acidosis. Current Outpatient Medications   Medication Sig Dispense Refill    rosuvastatin (CRESTOR) 5 MG tablet Take 1 tablet by mouth daily 30 tablet 5    furosemide (LASIX) 20 MG tablet Take 1 tablet by mouth daily 60 tablet 3    nitroGLYCERIN (NITROSTAT) 0.4 MG SL tablet Place 1 tablet under the tongue every 5 minutes as needed for Chest pain 25 tablet 3    mirtazapine (REMERON) 15 MG tablet Take 1 tablet by mouth nightly. (Patient taking differently: Take 15 mg by mouth as needed) 30 tablet 3    carvedilol (COREG) 25 MG tablet Take 25 mg by mouth 2 times daily (with meals).  Instructed to take with sip water am of procedure      calcium carbonate-vitamin D 600-200 MG-UNIT TABS Take 1 tablet by mouth daily. LD 3/11/2015      levothyroxine (SYNTHROID) 25 MCG tablet Take 25 mcg by mouth Daily. aspirin 81 MG tablet Take 81 mg by mouth daily. LD 3/11/2015      isosorbide mononitrate (IMDUR) 30 MG CR tablet Take 1 tablet by mouth daily. 30 tablet 12    potassium chloride (KLOR-CON M) 20 MEQ extended release tablet Take 1 tablet by mouth 2 times daily (with meals) 60 tablet 3    Ranitidine HCl (ZANTAC 75 PO) Take 1 tablet by mouth as needed      vitamin B-12 (CYANOCOBALAMIN) 1000 MCG tablet Take 1,000 mcg by mouth daily LD 3/11/2015       No current facility-administered medications for this visit. Social History     Socioeconomic History    Marital status:      Spouse name: Not on file    Number of children: Not on file    Years of education: Not on file    Highest education level: Not on file   Occupational History    Not on file   Tobacco Use    Smoking status: Former     Packs/day: 1.00     Years: 12.00     Pack years: 12.00     Types: Cigarettes     Quit date: 1982     Years since quittin.1    Smokeless tobacco: Never    Tobacco comments:      exposed to second hand smoke from . Vaping Use    Vaping Use: Never used   Substance and Sexual Activity    Alcohol use: Yes     Alcohol/week: 0.0 standard drinks     Comment: rare    Drug use: No    Sexual activity: Not on file   Other Topics Concern    Not on file   Social History Narrative    Not on file     Social Determinants of Health     Financial Resource Strain: Not on file   Food Insecurity: Not on file   Transportation Needs: Not on file   Physical Activity: Not on file   Stress: Not on file   Social Connections: Not on file   Intimate Partner Violence: Not on file   Housing Stability: Not on file       No family history on file. Review of Systems  Constitutional: Negative for fever, malaise/fatigue and weight loss.   HENT: Negative for sore throat and tinnitus. Eyes: Negative for blurred vision and double vision. Respiratory: Negative for shortness of breath. Negative for cough and wheezing. Cardiovascular: As mentioned in HPI. Gastrointestinal: Negative for abdominal pain, heartburn, nausea and vomiting. Genitourinary: Negative. Musculoskeletal: Negative for back pain, joint pain and myalgias. Neurological: Negative for dizziness, tremors, loss of consciousness and headaches. Endo/Heme/Allergies: Negative. Psychiatric/Behavioral: Negative for depression and suicidal ideas. Physical Exam   BP (!) 152/92   Pulse 86   Resp 18   Ht 5' 1\" (1.549 m)   Wt 177 lb (80.3 kg)   BMI 33.44 kg/m²   Constitutional: Oriented to person, place, and time. Well-developed and well-nourished. No distress. Head: Normocephalic and atraumatic. Eyes: EOM are normal. Pupils are equal, round, and reactive to light. Neck: Normal range of motion. Neck supple. No hepatojugular reflux and no JVD present. Carotid bruit is not present. No tracheal deviation present. No thyromegaly present. Cardiovascular: Normal rate, regular rhythm, normal heart sounds and intact distal pulses. Exam reveals no gallop and no friction rub. No murmur heard. Pulmonary/Chest: Effort normal and breath sounds normal. No respiratory distress. No wheezes. No rales. No tenderness. Abdominal: Soft. Bowel sounds are normal. No distension and no mass. No tenderness. No rebound and no guarding. Musculoskeletal: Normal range of motion. No edema and no tenderness. Lymphadenopathy:   No cervical adenopathy. Neurological: Alert and oriented to person, place, and time. Skin: Skin is warm and dry. No rash noted. Not diaphoretic. No erythema. Psychiatric: Normal mood and affect. Behavior is normal.     Procedures and Testing  EKG: Done in the office today and reviewed by me. Normal sinus rhythm.   Nonspecific ST segment changes    ASSESSMENT:   Diagnosis Orders 1. Coronary artery disease, unspecified vessel or lesion type, unspecified whether angina present, unspecified whether native or transplanted heart  EKG 12 lead    ECHO Complete 2D W Doppler W Color      2. Ischemic cardiomyopathy  ECHO Complete 2D W Doppler W Color      3. Essential hypertension        4. Controlled type 2 diabetes mellitus without complication, without long-term current use of insulin (HCC)        5. Stage 3a chronic kidney disease (HCC)        6. Obesity (BMI 30.0-34. 9)             Plan:  1. Coronary artery disease status post PCI: She is stable and asymptomatic. Continue with medical management. Continue aspirin 81, Coreg 25 twice daily and Imdur 30 mg daily. Recent lipid panel reviewed. LDL is at goal at 44. Triglycerides at 111.  2.  Hypertension: Her pressure is above goal in the office today. Continue medications as above. She is also on Lasix 20 mg daily. She was on lisinopril which was discontinued a year ago. Goal for her is less than 130/80. She will maintain a log for a week and call me with an update. She does have mild CKD-stage IIIa. She pressures be elevated, I will challenge her with lisinopril. 3.  Ischemic cardiomyopathy with recovered ejection fraction: She appears to be stable and asymptomatic. Continue medications as above. We will check an echocardiogram to assess biventricular function and rule out any significant valvular abnormalities. 4.  Hyperlipidemia: As discussed above. Dietary counseling provided. LDL continues to be at goal.  She is on Crestor 5 mg daily. 5.  Moderate obesity: Counseled for compliance with diet, exercise and weight loss. Follow-up in the office in 6 months. Iraj Curiel MD  [facility]) Cardiology     NOTE: This report was transcribed using voice recognition software. Every effort was made to ensure accuracy; however, inadvertent computerized transcription errors may be present.

## 2023-08-11 RX ORDER — ROSUVASTATIN CALCIUM 5 MG/1
5 TABLET, COATED ORAL DAILY
Qty: 60 TABLET | Refills: 2 | Status: SHIPPED | OUTPATIENT
Start: 2023-08-11

## 2024-02-08 ENCOUNTER — TELEPHONE (OUTPATIENT)
Dept: CARDIOLOGY CLINIC | Age: 84
End: 2024-02-08

## 2024-02-08 NOTE — TELEPHONE ENCOUNTER
Patient left message in [employer] for a medication refill, but did not specify which  medication.  Called patient and she does not have a voicemail set up.  Will wait for patient to call back.

## 2024-02-12 ENCOUNTER — TELEPHONE (OUTPATIENT)
Dept: CARDIOLOGY | Age: 84
End: 2024-02-12

## 2024-02-12 RX ORDER — ROSUVASTATIN CALCIUM 5 MG/1
5 TABLET, COATED ORAL DAILY
Qty: 60 TABLET | Refills: 0 | Status: SHIPPED | OUTPATIENT
Start: 2024-02-12

## 2024-02-12 RX ORDER — ROSUVASTATIN CALCIUM 5 MG/1
5 TABLET, COATED ORAL DAILY
Qty: 30 TABLET | Refills: 2 | Status: SHIPPED | OUTPATIENT
Start: 2024-02-12

## 2024-02-12 NOTE — TELEPHONE ENCOUNTER
Echo Test not completed order . Does patient still need testing completed? If so we need new order please and thank you.    Electronically signed by Elif Castillo on 2024 at 1:52 PM

## 2024-02-13 RX ORDER — ROSUVASTATIN CALCIUM 5 MG/1
5 TABLET, COATED ORAL DAILY
Qty: 60 TABLET | Refills: 0 | OUTPATIENT
Start: 2024-02-13

## 2024-02-26 ENCOUNTER — TELEPHONE (OUTPATIENT)
Dept: CARDIOLOGY CLINIC | Age: 84
End: 2024-02-26

## 2024-02-27 ENCOUNTER — OFFICE VISIT (OUTPATIENT)
Dept: CARDIOLOGY CLINIC | Age: 84
End: 2024-02-27
Payer: MEDICARE

## 2024-02-27 VITALS
BODY MASS INDEX: 33.23 KG/M2 | DIASTOLIC BLOOD PRESSURE: 80 MMHG | SYSTOLIC BLOOD PRESSURE: 140 MMHG | WEIGHT: 176 LBS | HEART RATE: 92 BPM | HEIGHT: 61 IN | RESPIRATION RATE: 18 BRPM

## 2024-02-27 DIAGNOSIS — E66.9 OBESITY (BMI 30.0-34.9): ICD-10-CM

## 2024-02-27 DIAGNOSIS — I10 PRIMARY HYPERTENSION: ICD-10-CM

## 2024-02-27 DIAGNOSIS — I10 ESSENTIAL HYPERTENSION: ICD-10-CM

## 2024-02-27 DIAGNOSIS — R06.09 DOE (DYSPNEA ON EXERTION): ICD-10-CM

## 2024-02-27 DIAGNOSIS — N18.31 STAGE 3A CHRONIC KIDNEY DISEASE (HCC): ICD-10-CM

## 2024-02-27 DIAGNOSIS — I25.5 ISCHEMIC CARDIOMYOPATHY: ICD-10-CM

## 2024-02-27 DIAGNOSIS — E78.49 OTHER HYPERLIPIDEMIA: ICD-10-CM

## 2024-02-27 DIAGNOSIS — I25.10 CORONARY ARTERY DISEASE INVOLVING NATIVE CORONARY ARTERY OF NATIVE HEART WITHOUT ANGINA PECTORIS: Primary | ICD-10-CM

## 2024-02-27 DIAGNOSIS — R00.2 PALPITATIONS: ICD-10-CM

## 2024-02-27 PROCEDURE — 99214 OFFICE O/P EST MOD 30 MIN: CPT | Performed by: INTERNAL MEDICINE

## 2024-02-27 PROCEDURE — 3079F DIAST BP 80-89 MM HG: CPT | Performed by: INTERNAL MEDICINE

## 2024-02-27 PROCEDURE — G8484 FLU IMMUNIZE NO ADMIN: HCPCS | Performed by: INTERNAL MEDICINE

## 2024-02-27 PROCEDURE — G8417 CALC BMI ABV UP PARAM F/U: HCPCS | Performed by: INTERNAL MEDICINE

## 2024-02-27 PROCEDURE — 1036F TOBACCO NON-USER: CPT | Performed by: INTERNAL MEDICINE

## 2024-02-27 PROCEDURE — G8400 PT W/DXA NO RESULTS DOC: HCPCS | Performed by: INTERNAL MEDICINE

## 2024-02-27 PROCEDURE — 1090F PRES/ABSN URINE INCON ASSESS: CPT | Performed by: INTERNAL MEDICINE

## 2024-02-27 PROCEDURE — 93000 ELECTROCARDIOGRAM COMPLETE: CPT | Performed by: INTERNAL MEDICINE

## 2024-02-27 PROCEDURE — G8427 DOCREV CUR MEDS BY ELIG CLIN: HCPCS | Performed by: INTERNAL MEDICINE

## 2024-02-27 PROCEDURE — 1123F ACP DISCUSS/DSCN MKR DOCD: CPT | Performed by: INTERNAL MEDICINE

## 2024-02-27 PROCEDURE — 3077F SYST BP >= 140 MM HG: CPT | Performed by: INTERNAL MEDICINE

## 2024-02-27 RX ORDER — LISINOPRIL 20 MG/1
TABLET ORAL
COMMUNITY
Start: 2024-02-05

## 2024-02-27 NOTE — PROGRESS NOTES
Patient was seen today and a 7 day XT monitor was placed. Monitor was ordered by Dr. Campuzano. The monitor was applied, instructions were given to the patient. Patient stated understanding and gave verbalize readback.   Monitor company: [employer]  Serial number: [id number]

## 2024-02-27 NOTE — PROGRESS NOTES
CHIEF COMPLAINT:   Chief Complaint   Patient presents with    Coronary Artery Disease     C/o HR is elevated        HISTORY OF PRESENT ILLNESS: Patient is a 83 y.o. female seen at the request of Clint Waters MD for a follow-up visit with me.    She has a history of ischemic cardiomyopathy, with recovered ejection fraction, coronary artery disease status post PCI, hypertension, hyperlipidemia, moderate obesity.     She also had COVID-19 in January 2022.  She was admitted to the hospital for a period of 4 days.  Since discharge, she has been recovering well.  Lisinopril was discontinued in the hospital likely for cough.      She is complaining of palpitations today.  She admits to increased ongoing stress.  Her  has filed for divorce and this is the main reason.   her pressures are elevated in the office and she took her medications an hour prior to her visit.  She does not check her pressures at home although she does have a blood pressure cuff.    At today's office visit, She denies any chest pain, shortness of breath, palpitations, dizziness, pedal edema. The patient is capable of activities of daily living. There is dyspnea on more than moderate exertion. There is no orthopnea or PND. She is compliant with medications as well as diet and exercise regimen.    Prior Cardiac workup:  History of ischemic cardiomyopathy since 2007, with echocardiogram at      that time showing EF of 25%, and a cardiac catheterization in May 2007,      showing LAD stenosis; 80% to 90%; and an EF of 20% to 25%.  No disease of      the circumflex or RCA.  Had a drug-eluting stent to the LAD with last      Lexiscan nuclear stress test in June 2014, being nonischemic and      hyperdynamic left ventricular systolic function.      Nuclear perfusion scan from 4/9/2015: No perfusion defects.    Past Medical History:   Diagnosis Date    ACL (anterior cruciate ligament) rupture     Acute ischemic colitis (HCC) 4/30/2012    Anxiety

## 2024-02-27 NOTE — PATIENT INSTRUCTIONS
Continue all your medications at current doses.   Take an extra 20 mg lasix for the next 3 days. Call me with an update.   We will schedule 1 week holter monitor. Echocardiogram.   Restrict sodium intake to less than 2-2.5 g/day. Restrict fluid intake to less than 2.2 L/day. Goal BP is less than 130/80.   If your weight increases by > 2 lbs in a day or >5 lbs in more than a day, take an extra lasix pill.   Please try to exercise for 150 minutes a week.   I will see you back in the office in 6 months. Please call the office at [phone], option [phone]) if you have any questions.

## 2024-05-14 RX ORDER — ROSUVASTATIN CALCIUM 5 MG/1
5 TABLET, COATED ORAL DAILY
Qty: 30 TABLET | Refills: 3 | Status: SHIPPED | OUTPATIENT
Start: 2024-05-14

## 2024-05-22 ENCOUNTER — HOSPITAL ENCOUNTER (OUTPATIENT)
Dept: CARDIOLOGY | Age: 84
Discharge: HOME | End: 2024-05-24
Attending: INTERNAL MEDICINE
Payer: MEDICARE

## 2024-05-22 VITALS
DIASTOLIC BLOOD PRESSURE: 80 MMHG | WEIGHT: 176 LBS | BODY MASS INDEX: 33.23 KG/M2 | SYSTOLIC BLOOD PRESSURE: 140 MMHG | HEIGHT: 61 IN

## 2024-05-22 DIAGNOSIS — R06.09 DOE (DYSPNEA ON EXERTION): ICD-10-CM

## 2024-05-22 PROCEDURE — 93306 TTE W/DOPPLER COMPLETE: CPT

## 2024-05-23 LAB
ECHO AV AREA PEAK VELOCITY: 1.3 CM2
ECHO AV AREA VTI: 1.4 CM2
ECHO AV AREA/BSA PEAK VELOCITY: 0.7 CM2/M2
ECHO AV AREA/BSA VTI: 0.8 CM2/M2
ECHO AV CUSP MM: 1.2 CM
ECHO AV MEAN GRADIENT: 12 MMHG
ECHO AV MEAN VELOCITY: 1.6 M/S
ECHO AV PEAK GRADIENT: 25 MMHG
ECHO AV PEAK VELOCITY: 2.5 M/S
ECHO AV VELOCITY RATIO: 0.36
ECHO AV VTI: 53.8 CM
ECHO BSA: 1.85 M2
ECHO EST RA PRESSURE: 3 MMHG
ECHO LA DIAMETER INDEX: 2.18 CM/M2
ECHO LA DIAMETER: 3.9 CM
ECHO LA VOL A-L A2C: 69 ML (ref 22–52)
ECHO LA VOL A-L A4C: 69 ML (ref 22–52)
ECHO LA VOL MOD A2C: 65 ML (ref 22–52)
ECHO LA VOL MOD A4C: 61 ML (ref 22–52)
ECHO LA VOLUME AREA LENGTH: 70 ML
ECHO LA VOLUME INDEX A-L A2C: 39 ML/M2 (ref 16–34)
ECHO LA VOLUME INDEX A-L A4C: 39 ML/M2 (ref 16–34)
ECHO LA VOLUME INDEX AREA LENGTH: 39 ML/M2 (ref 16–34)
ECHO LA VOLUME INDEX MOD A2C: 36 ML/M2 (ref 16–34)
ECHO LA VOLUME INDEX MOD A4C: 34 ML/M2 (ref 16–34)
ECHO LV FRACTIONAL SHORTENING: 38 % (ref 28–44)
ECHO LV INTERNAL DIMENSION DIASTOLE INDEX: 2.18 CM/M2
ECHO LV INTERNAL DIMENSION DIASTOLIC: 3.9 CM (ref 3.9–5.3)
ECHO LV INTERNAL DIMENSION SYSTOLIC INDEX: 1.34 CM/M2
ECHO LV INTERNAL DIMENSION SYSTOLIC: 2.4 CM
ECHO LV IVSD: 1.1 CM (ref 0.6–0.9)
ECHO LV MASS 2D: 140.1 G (ref 67–162)
ECHO LV MASS INDEX 2D: 78.3 G/M2 (ref 43–95)
ECHO LV POSTERIOR WALL DIASTOLIC: 1.1 CM (ref 0.6–0.9)
ECHO LV RELATIVE WALL THICKNESS RATIO: 0.56
ECHO LVOT AREA: 3.8 CM2
ECHO LVOT AV VTI INDEX: 0.37
ECHO LVOT DIAM: 2.2 CM
ECHO LVOT MEAN GRADIENT: 2 MMHG
ECHO LVOT PEAK GRADIENT: 3 MMHG
ECHO LVOT PEAK VELOCITY: 0.9 M/S
ECHO LVOT STROKE VOLUME INDEX: 42 ML/M2
ECHO LVOT SV: 75.2 ML
ECHO LVOT VTI: 19.8 CM
ECHO MV A VELOCITY: 1.09 M/S
ECHO MV AREA VTI: 2.7 CM2
ECHO MV E DECELERATION TIME (DT): 226.4 MS
ECHO MV E VELOCITY: 0.85 M/S
ECHO MV E/A RATIO: 0.78
ECHO MV LVOT VTI INDEX: 1.42
ECHO MV MAX VELOCITY: 1.1 M/S
ECHO MV MEAN GRADIENT: 2 MMHG
ECHO MV MEAN VELOCITY: 0.6 M/S
ECHO MV PEAK GRADIENT: 5 MMHG
ECHO MV VTI: 28.1 CM
ECHO RIGHT VENTRICULAR SYSTOLIC PRESSURE (RVSP): 26 MMHG
ECHO RV INTERNAL DIMENSION: 2.4 CM
ECHO TV REGURGITANT MAX VELOCITY: 2.42 M/S
ECHO TV REGURGITANT PEAK GRADIENT: 24 MMHG

## 2024-05-28 ENCOUNTER — TELEPHONE (OUTPATIENT)
Dept: CARDIOLOGY CLINIC | Age: 84
End: 2024-05-28

## 2024-05-28 NOTE — TELEPHONE ENCOUNTER
----- Message from Jerome Campuzano MD sent at 5/28/2024  9:22 AM EDT -----  Stable echo. Did the extra lasix help?

## 2024-08-06 ENCOUNTER — APPOINTMENT (OUTPATIENT)
Dept: CT IMAGING | Age: 84
End: 2024-08-06
Payer: MEDICARE

## 2024-08-06 ENCOUNTER — HOSPITAL ENCOUNTER (INPATIENT)
Age: 84
LOS: 4 days | Discharge: NURSING, REHAB OR LONG-TERM CARE | End: 2024-08-10
Attending: EMERGENCY MEDICINE | Admitting: SURGERY
Payer: MEDICARE

## 2024-08-06 ENCOUNTER — APPOINTMENT (OUTPATIENT)
Dept: GENERAL RADIOLOGY | Age: 84
End: 2024-08-06
Payer: MEDICARE

## 2024-08-06 DIAGNOSIS — S00.83XA TRAUMATIC HEMATOMA OF FOREHEAD, INITIAL ENCOUNTER: ICD-10-CM

## 2024-08-06 DIAGNOSIS — R55 SYNCOPE, UNSPECIFIED SYNCOPE TYPE: ICD-10-CM

## 2024-08-06 DIAGNOSIS — D32.9 MENINGIOMA (HCC): ICD-10-CM

## 2024-08-06 DIAGNOSIS — I62.9 INTRACRANIAL HEMORRHAGE (HCC): ICD-10-CM
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
## 2024-08-06 PROBLEM — S06.6XAA TRAUMATIC SUBARACHNOID HEMORRHAGE WITH UNKNOWN LOSS OF CONSCIOUSNESS STATUS (HCC): Status: ACTIVE | Noted: 2024-08-06

## 2024-08-06 PROBLEM — S00.93XA TRAUMATIC CEPHALOHEMATOMA: Status: ACTIVE | Noted: 2024-08-06

## 2024-08-06 PROBLEM — T14.90XA TRAUMA: Status: ACTIVE | Noted: 2024-08-06

## 2024-08-06 LAB
ALBUMIN SERPL-MCNC: 4 G/DL (ref 3.5–5.2)
ALP SERPL-CCNC: 67 U/L (ref 35–104)
ALT SERPL-CCNC: 8 U/L (ref 0–32)
ANION GAP SERPL CALCULATED.3IONS-SCNC: 16 MMOL/L (ref 7–16)
AST SERPL-CCNC: 20 U/L (ref 0–31)
BASOPHILS # BLD: 0.01 K/UL (ref 0–0.2)
BASOPHILS NFR BLD: 0 % (ref 0–2)
BILIRUB SERPL-MCNC: 0.3 MG/DL (ref 0–1.2)
BUN SERPL-MCNC: 40 MG/DL (ref 6–23)
CALCIUM SERPL-MCNC: 10.4 MG/DL (ref 8.6–10.2)
CHLORIDE SERPL-SCNC: 105 MMOL/L (ref 98–107)
CLOT ANGLE.KAOLIN INDUCED BLD RES TEG: 76.7 DEG (ref 53–70)
CO2 SERPL-SCNC: 18 MMOL/L (ref 22–29)
CREAT SERPL-MCNC: 1.4 MG/DL (ref 0.5–1)
EOSINOPHIL # BLD: 0 K/UL (ref 0.05–0.5)
EOSINOPHILS RELATIVE PERCENT: 0 % (ref 0–6)
EPL-TEG: 0.1 % (ref 0–15)
ERYTHROCYTE [DISTWIDTH] IN BLOOD BY AUTOMATED COUNT: 16.9 % (ref 11.5–15)
G-TEG: 15.6 KDYN/CM2 (ref 4.5–11)
GFR, ESTIMATED: 38 ML/MIN/1.73M2
GLUCOSE BLD-MCNC: 166 MG/DL (ref 74–99)
GLUCOSE SERPL-MCNC: 184 MG/DL (ref 74–99)
HCT VFR BLD AUTO: 32.5 % (ref 34–48)
HGB BLD-MCNC: 9.8 G/DL (ref 11.5–15.5)
IMM GRANULOCYTES # BLD AUTO: 0.16 K/UL (ref 0–0.58)
IMM GRANULOCYTES NFR BLD: 3 % (ref 0–5)
INR PPP: 1.1
KINETICS TEG: 0.9 MIN (ref 1–3)
LY30 (LYSIS) TEG: 0.1 % (ref 0–8)
LYMPHOCYTES NFR BLD: 1.26 K/UL (ref 1.5–4)
LYMPHOCYTES RELATIVE PERCENT: 23 % (ref 20–42)
MA (MAX CLOT) TEG: 75.8 MM (ref 50–70)
MCH RBC QN AUTO: 28.4 PG (ref 26–35)
MCHC RBC AUTO-ENTMCNC: 30.2 G/DL (ref 32–34.5)
MCV RBC AUTO: 94.2 FL (ref 80–99.9)
MONOCYTES NFR BLD: 0.47 K/UL (ref 0.1–0.95)
MONOCYTES NFR BLD: 9 % (ref 2–12)
NEUTROPHILS NFR BLD: 65 % (ref 43–80)
NEUTS SEG NFR BLD: 3.56 K/UL (ref 1.8–7.3)
PLATELET # BLD AUTO: 235 K/UL (ref 130–450)
PMV BLD AUTO: 11 FL (ref 7–12)
POTASSIUM SERPL-SCNC: 4.6 MMOL/L (ref 3.5–5)
PROT SERPL-MCNC: 7.9 G/DL (ref 6.4–8.3)
PROTHROMBIN TIME: 12.5 SEC (ref 9.3–12.4)
RBC # BLD AUTO: 3.45 M/UL (ref 3.5–5.5)
REACTION TIME TEG: 5.1 MIN (ref 5–10)
SODIUM SERPL-SCNC: 139 MMOL/L (ref 132–146)
TROPONIN I SERPL HS-MCNC: 10 NG/L (ref 0–9)
TROPONIN I SERPL HS-MCNC: 12 NG/L (ref 0–9)
TSH SERPL DL<=0.05 MIU/L-ACNC: 1.1 UIU/ML (ref 0.27–4.2)
WBC OTHER # BLD: 5.5 K/UL (ref 4.5–11.5)

## 2024-08-06 PROCEDURE — 85384 FIBRINOGEN ACTIVITY: CPT

## 2024-08-06 PROCEDURE — 84484 ASSAY OF TROPONIN QUANT: CPT

## 2024-08-06 PROCEDURE — 72125 CT NECK SPINE W/O DYE: CPT

## 2024-08-06 PROCEDURE — 72170 X-RAY EXAM OF PELVIS: CPT

## 2024-08-06 PROCEDURE — 96376 TX/PRO/DX INJ SAME DRUG ADON: CPT

## 2024-08-06 PROCEDURE — 80053 COMPREHEN METABOLIC PANEL: CPT

## 2024-08-06 PROCEDURE — 6370000000 HC RX 637 (ALT 250 FOR IP)

## 2024-08-06 PROCEDURE — 2580000003 HC RX 258

## 2024-08-06 PROCEDURE — 2580000003 HC RX 258: Performed by: STUDENT IN AN ORGANIZED HEALTH CARE EDUCATION/TRAINING PROGRAM

## 2024-08-06 PROCEDURE — 6360000002 HC RX W HCPCS

## 2024-08-06 PROCEDURE — 85576 BLOOD PLATELET AGGREGATION: CPT

## 2024-08-06 PROCEDURE — 96375 TX/PRO/DX INJ NEW DRUG ADDON: CPT

## 2024-08-06 PROCEDURE — 84443 ASSAY THYROID STIM HORMONE: CPT

## 2024-08-06 PROCEDURE — 85025 COMPLETE CBC W/AUTO DIFF WBC: CPT

## 2024-08-06 PROCEDURE — 82962 GLUCOSE BLOOD TEST: CPT

## 2024-08-06 PROCEDURE — 2140000000 HC CCU INTERMEDIATE R&B

## 2024-08-06 PROCEDURE — 99285 EMERGENCY DEPT VISIT HI MDM: CPT

## 2024-08-06 PROCEDURE — 85610 PROTHROMBIN TIME: CPT

## 2024-08-06 PROCEDURE — 85347 COAGULATION TIME ACTIVATED: CPT

## 2024-08-06 PROCEDURE — 93005 ELECTROCARDIOGRAM TRACING: CPT | Performed by: STUDENT IN AN ORGANIZED HEALTH CARE EDUCATION/TRAINING PROGRAM

## 2024-08-06 PROCEDURE — 99223 1ST HOSP IP/OBS HIGH 75: CPT | Performed by: SURGERY

## 2024-08-06 PROCEDURE — 96374 THER/PROPH/DIAG INJ IV PUSH: CPT

## 2024-08-06 PROCEDURE — 70450 CT HEAD/BRAIN W/O DYE: CPT

## 2024-08-06 PROCEDURE — 6360000002 HC RX W HCPCS: Performed by: STUDENT IN AN ORGANIZED HEALTH CARE EDUCATION/TRAINING PROGRAM

## 2024-08-06 PROCEDURE — 71045 X-RAY EXAM CHEST 1 VIEW: CPT

## 2024-08-06 PROCEDURE — 85390 FIBRINOLYSINS SCREEN I&R: CPT

## 2024-08-06 PROCEDURE — 70486 CT MAXILLOFACIAL W/O DYE: CPT

## 2024-08-06 RX ORDER — ONDANSETRON 2 MG/ML
4 INJECTION INTRAMUSCULAR; INTRAVENOUS EVERY 6 HOURS PRN
Status: DISCONTINUED | OUTPATIENT
Start: 2024-08-06 | End: 2024-08-10 | Stop reason: HOSPADM

## 2024-08-06 RX ORDER — CARVEDILOL 25 MG/1
25 TABLET ORAL 2 TIMES DAILY WITH MEALS
Status: DISCONTINUED | OUTPATIENT
Start: 2024-08-07 | End: 2024-08-10 | Stop reason: HOSPADM

## 2024-08-06 RX ORDER — SODIUM CHLORIDE 0.9 % (FLUSH) 0.9 %
10 SYRINGE (ML) INJECTION PRN
Status: DISCONTINUED | OUTPATIENT
Start: 2024-08-06 | End: 2024-08-10 | Stop reason: HOSPADM

## 2024-08-06 RX ORDER — INSULIN LISPRO 100 [IU]/ML
0-8 INJECTION, SOLUTION INTRAVENOUS; SUBCUTANEOUS
Status: DISCONTINUED | OUTPATIENT
Start: 2024-08-07 | End: 2024-08-10 | Stop reason: HOSPADM

## 2024-08-06 RX ORDER — LABETALOL HYDROCHLORIDE 5 MG/ML
10 INJECTION, SOLUTION INTRAVENOUS EVERY 4 HOURS
Status: DISCONTINUED | OUTPATIENT
Start: 2024-08-07 | End: 2024-08-10 | Stop reason: HOSPADM

## 2024-08-06 RX ORDER — LABETALOL HYDROCHLORIDE 5 MG/ML
10 INJECTION, SOLUTION INTRAVENOUS EVERY 10 MIN PRN
Status: DISCONTINUED | OUTPATIENT
Start: 2024-08-06 | End: 2024-08-06

## 2024-08-06 RX ORDER — LABETALOL HYDROCHLORIDE 5 MG/ML
10 INJECTION, SOLUTION INTRAVENOUS EVERY 4 HOURS PRN
Status: DISCONTINUED | OUTPATIENT
Start: 2024-08-06 | End: 2024-08-06

## 2024-08-06 RX ORDER — DEXTROSE MONOHYDRATE 100 MG/ML
INJECTION, SOLUTION INTRAVENOUS CONTINUOUS PRN
Status: DISCONTINUED | OUTPATIENT
Start: 2024-08-06 | End: 2024-08-10 | Stop reason: HOSPADM

## 2024-08-06 RX ORDER — ACETAMINOPHEN 325 MG/1
650 TABLET ORAL EVERY 6 HOURS
Status: DISCONTINUED | OUTPATIENT
Start: 2024-08-06 | End: 2024-08-10 | Stop reason: HOSPADM

## 2024-08-06 RX ORDER — BUTALBITAL, ACETAMINOPHEN AND CAFFEINE 50; 325; 40 MG/1; MG/1; MG/1
1 TABLET ORAL EVERY 4 HOURS PRN
Status: DISCONTINUED | OUTPATIENT
Start: 2024-08-06 | End: 2024-08-10 | Stop reason: HOSPADM

## 2024-08-06 RX ORDER — INSULIN LISPRO 100 [IU]/ML
0-4 INJECTION, SOLUTION INTRAVENOUS; SUBCUTANEOUS NIGHTLY
Status: DISCONTINUED | OUTPATIENT
Start: 2024-08-06 | End: 2024-08-10 | Stop reason: HOSPADM

## 2024-08-06 RX ORDER — ONDANSETRON 4 MG/1
4 TABLET, ORALLY DISINTEGRATING ORAL EVERY 8 HOURS PRN
Status: DISCONTINUED | OUTPATIENT
Start: 2024-08-06 | End: 2024-08-10 | Stop reason: HOSPADM

## 2024-08-06 RX ORDER — SODIUM CHLORIDE 9 MG/ML
INJECTION, SOLUTION INTRAVENOUS CONTINUOUS
Status: DISCONTINUED | OUTPATIENT
Start: 2024-08-06 | End: 2024-08-08

## 2024-08-06 RX ORDER — LEVOTHYROXINE SODIUM 0.03 MG/1
25 TABLET ORAL DAILY
Status: DISCONTINUED | OUTPATIENT
Start: 2024-08-07 | End: 2024-08-10 | Stop reason: HOSPADM

## 2024-08-06 RX ORDER — ISOSORBIDE MONONITRATE 30 MG/1
30 TABLET, EXTENDED RELEASE ORAL DAILY
Status: DISCONTINUED | OUTPATIENT
Start: 2024-08-07 | End: 2024-08-10 | Stop reason: HOSPADM

## 2024-08-06 RX ORDER — LEVETIRACETAM 500 MG/5ML
1000 INJECTION, SOLUTION, CONCENTRATE INTRAVENOUS ONCE
Status: COMPLETED | OUTPATIENT
Start: 2024-08-06 | End: 2024-08-06

## 2024-08-06 RX ORDER — ROSUVASTATIN CALCIUM 5 MG/1
5 TABLET, COATED ORAL DAILY
Status: DISCONTINUED | OUTPATIENT
Start: 2024-08-07 | End: 2024-08-10 | Stop reason: HOSPADM

## 2024-08-06 RX ORDER — LEVETIRACETAM 500 MG/1
500 TABLET ORAL 2 TIMES DAILY
Status: DISCONTINUED | OUTPATIENT
Start: 2024-08-07 | End: 2024-08-10 | Stop reason: HOSPADM

## 2024-08-06 RX ORDER — 0.9 % SODIUM CHLORIDE 0.9 %
1000 INTRAVENOUS SOLUTION INTRAVENOUS ONCE
Status: COMPLETED | OUTPATIENT
Start: 2024-08-06 | End: 2024-08-06

## 2024-08-06 RX ORDER — ONDANSETRON 2 MG/ML
4 INJECTION INTRAMUSCULAR; INTRAVENOUS ONCE
Status: COMPLETED | OUTPATIENT
Start: 2024-08-06 | End: 2024-08-06

## 2024-08-06 RX ORDER — SODIUM CHLORIDE 0.9 % (FLUSH) 0.9 %
10 SYRINGE (ML) INJECTION EVERY 12 HOURS SCHEDULED
Status: DISCONTINUED | OUTPATIENT
Start: 2024-08-06 | End: 2024-08-10 | Stop reason: HOSPADM

## 2024-08-06 RX ORDER — MIRTAZAPINE 15 MG/1
15 TABLET, FILM COATED ORAL PRN
Status: DISCONTINUED | OUTPATIENT
Start: 2024-08-06 | End: 2024-08-10 | Stop reason: HOSPADM

## 2024-08-06 RX ORDER — MORPHINE SULFATE 4 MG/ML
4 INJECTION, SOLUTION INTRAMUSCULAR; INTRAVENOUS ONCE
Status: COMPLETED | OUTPATIENT
Start: 2024-08-06 | End: 2024-08-06

## 2024-08-06 RX ORDER — SODIUM CHLORIDE 9 MG/ML
INJECTION, SOLUTION INTRAVENOUS PRN
Status: DISCONTINUED | OUTPATIENT
Start: 2024-08-06 | End: 2024-08-10 | Stop reason: HOSPADM

## 2024-08-06 RX ORDER — SENNA AND DOCUSATE SODIUM 50; 8.6 MG/1; MG/1
1 TABLET, FILM COATED ORAL 2 TIMES DAILY
Status: DISCONTINUED | OUTPATIENT
Start: 2024-08-06 | End: 2024-08-10 | Stop reason: HOSPADM

## 2024-08-06 RX ORDER — HYDRALAZINE HYDROCHLORIDE 20 MG/ML
10 INJECTION INTRAMUSCULAR; INTRAVENOUS EVERY 4 HOURS PRN
Status: DISCONTINUED | OUTPATIENT
Start: 2024-08-06 | End: 2024-08-10 | Stop reason: HOSPADM

## 2024-08-06 RX ORDER — HYDRALAZINE HYDROCHLORIDE 20 MG/ML
10 INJECTION INTRAMUSCULAR; INTRAVENOUS EVERY 4 HOURS PRN
Status: DISCONTINUED | OUTPATIENT
Start: 2024-08-06 | End: 2024-08-06

## 2024-08-06 RX ORDER — POLYETHYLENE GLYCOL 3350 17 G/17G
17 POWDER, FOR SOLUTION ORAL DAILY
Status: DISCONTINUED | OUTPATIENT
Start: 2024-08-07 | End: 2024-08-10 | Stop reason: HOSPADM

## 2024-08-06 RX ORDER — GLUCAGON 1 MG/ML
1 KIT INJECTION PRN
Status: DISCONTINUED | OUTPATIENT
Start: 2024-08-06 | End: 2024-08-10 | Stop reason: HOSPADM

## 2024-08-06 RX ORDER — HYDRALAZINE HYDROCHLORIDE 20 MG/ML
10 INJECTION INTRAMUSCULAR; INTRAVENOUS EVERY 10 MIN PRN
Status: DISCONTINUED | OUTPATIENT
Start: 2024-08-06 | End: 2024-08-06

## 2024-08-06 RX ADMIN — HYDRALAZINE HYDROCHLORIDE 10 MG: 20 INJECTION, SOLUTION INTRAMUSCULAR; INTRAVENOUS at 22:06

## 2024-08-06 RX ADMIN — LABETALOL HYDROCHLORIDE 10 MG: 5 INJECTION INTRAVENOUS at 20:11

## 2024-08-06 RX ADMIN — SODIUM CHLORIDE: 9 INJECTION, SOLUTION INTRAVENOUS at 22:09

## 2024-08-06 RX ADMIN — LABETALOL HYDROCHLORIDE 10 MG: 5 INJECTION INTRAVENOUS at 19:59

## 2024-08-06 RX ADMIN — MORPHINE SULFATE 4 MG: 4 INJECTION, SOLUTION INTRAMUSCULAR; INTRAVENOUS at 18:17

## 2024-08-06 RX ADMIN — SENNOSIDES AND DOCUSATE SODIUM 1 TABLET: 50; 8.6 TABLET ORAL at 22:25

## 2024-08-06 RX ADMIN — SODIUM CHLORIDE, PRESERVATIVE FREE 10 ML: 5 INJECTION INTRAVENOUS at 22:39

## 2024-08-06 RX ADMIN — SODIUM CHLORIDE 1000 ML: 9 INJECTION, SOLUTION INTRAVENOUS at 19:50

## 2024-08-06 RX ADMIN — LABETALOL HYDROCHLORIDE 10 MG: 5 INJECTION INTRAVENOUS at 19:45

## 2024-08-06 RX ADMIN — HYDRALAZINE HYDROCHLORIDE 10 MG: 20 INJECTION, SOLUTION INTRAMUSCULAR; INTRAVENOUS at 20:37

## 2024-08-06 RX ADMIN — LEVETIRACETAM 1000 MG: 100 INJECTION INTRAVENOUS at 18:17

## 2024-08-06 RX ADMIN — ONDANSETRON 4 MG: 2 INJECTION INTRAMUSCULAR; INTRAVENOUS at 18:17

## 2024-08-06 RX ADMIN — LABETALOL HYDROCHLORIDE 10 MG: 5 INJECTION INTRAVENOUS at 19:18

## 2024-08-06 RX ADMIN — ACETAMINOPHEN 650 MG: 325 TABLET ORAL at 22:03

## 2024-08-06 ASSESSMENT — PAIN SCALES - GENERAL
PAINLEVEL_OUTOF10: 8
PAINLEVEL_OUTOF10: 10

## 2024-08-06 ASSESSMENT — PAIN DESCRIPTION - DESCRIPTORS: DESCRIPTORS: THROBBING

## 2024-08-06 ASSESSMENT — PAIN DESCRIPTION - LOCATION
LOCATION: HEAD
LOCATION: HEAD

## 2024-08-06 NOTE — ED PROVIDER NOTES
08/06/24 2110 135/62 97 28 96 %   08/06/24 2105 -- 95 28 96 %   08/06/24 2100 132/60 95 28 95 %   08/06/24 2055 (!) 147/60 94 29 96 %   08/06/24 2050 -- 93 (!) 31 96 %   08/06/24 2045 (!) 150/67 92 29 95 %   08/06/24 2040 (!) 151/69 89 26 94 %   08/06/24 2037 (!) 151/69 92 24 94 %   08/06/24 2035 -- 88 29 95 %   08/06/24 2030 (!) 160/66 89 27 94 %   08/06/24 2015 (!) 168/67 90 26 95 %   08/06/24 2011 (!) 168/67 87 -- --   08/06/24 2005 (!) 160/78 85 26 95 %   08/06/24 2000 (!) 173/74 89 24 95 %   08/06/24 1959 (!) 173/74 88 -- --   08/06/24 1950 -- 89 18 96 %   08/06/24 1945 (!) 147/78 86 19 93 %   08/1940 -- 84 19 94 %   08/06/24 1935 (!) 163/66 88 19 95 %   08/06/24 1930 -- 84 27 95 %   08/06/24 1925 -- 85 24 97 %   08/06/24 1920 -- 95 18 95 %   08/06/24 1915 -- 88 24 94 %   08/06/24 1910 -- 87 24 96 %   08/06/24 1905 -- 87 24 94 %   08/06/24 1900 (!) 149/67 86 20 94 %   08/06/24 1855 -- 88 23 95 %   08/06/24 1850 -- 88 25 94 %   08/06/24 1845 -- 86 28 94 %   08/06/24 1840 -- 96 20 96 %   08/06/24 1835 -- 98 13 97 %   08/06/24 1830 127/71 88 20 97 %   08/06/24 1825 -- 86 26 93 %   08/06/24 1820 -- 93 25 96 %   08/06/24 1815 -- 94 21 97 %   08/06/24 1810 -- 95 22 97 %   08/06/24 1805 -- 95 26 96 %   08/06/24 1800 (!) 178/73 98 19 97 %   08/06/24 1755 -- (!) 104 19 95 %   08/06/24 1750 -- 93 23 97 %   08/06/24 1745 -- 90 18 97 %   08/06/24 1740 -- 92 21 96 %   08/06/24 1735 -- 92 17 97 %   08/06/24 1730 (!) 187/88 91 19 97 %   08/06/24 1725 -- 87 16 97 %   08/06/24 1720 -- 88 18 96 %   08/06/24 1715 -- 91 18 97 %   08/06/24 1710 -- 87 23 96 %   08/06/24 1705 -- 87 25 97 %   08/06/24 1700 (!) 188/75 84 22 97 %   08/06/24 1655 -- 85 22 97 %   08/06/24 1650 -- 86 23 95 %   08/06/24 1645 -- 91 21 96 %   08/06/24 1640 -- 87 20 96 %   08/06/24 1635 (!) 185/79 88 19 97 %   08/06/24 1615 -- 88 14 --   08/06/24 1610 -- 86 22 --   08/06/24 1605 -- 86 20 --   08/06/24 1600 -- 89 21 --   08/06/24 1555 -- 89 22 --          ------------- Final Impression, Disposition & Plan ---------------    Final Impression:   1. Fall, initial encounter    2. Traumatic hematoma of forehead, initial encounter    3. Intracranial hemorrhage (HCC)    4. Meningioma (HCC)        Disposition:  Admitted 08/06/2024 09:29:31 PM    PATIENT REFERRED TO:  No follow-up provider specified.    DISCHARGE MEDICATIONS:  New Prescriptions    No medications on file            Please note that portions of this note were completed with a voice recognition program.    Efforts were made to edit the dictations but occasionally words are mis-transcribed.    Pan Mcclain DO (electronically signed)

## 2024-08-07 ENCOUNTER — APPOINTMENT (OUTPATIENT)
Dept: CT IMAGING | Age: 84
End: 2024-08-07
Payer: MEDICARE

## 2024-08-07 PROBLEM — S00.83XA TRAUMATIC HEMATOMA OF FOREHEAD: Status: ACTIVE | Noted: 2024-08-07
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
## 2024-08-07 LAB
AMPHET UR QL SCN: NEGATIVE
ANION GAP SERPL CALCULATED.3IONS-SCNC: 16 MMOL/L (ref 7–16)
BARBITURATES UR QL SCN: NEGATIVE
BASOPHILS # BLD: 0.02 K/UL (ref 0–0.2)
BASOPHILS NFR BLD: 0 % (ref 0–2)
BENZODIAZ UR QL: NEGATIVE
BUN SERPL-MCNC: 26 MG/DL (ref 6–23)
BUPRENORPHINE UR QL: NEGATIVE
CALCIUM SERPL-MCNC: 9.8 MG/DL (ref 8.6–10.2)
CANNABINOIDS UR QL SCN: NEGATIVE
CHLORIDE SERPL-SCNC: 110 MMOL/L (ref 98–107)
CO2 SERPL-SCNC: 17 MMOL/L (ref 22–29)
COCAINE UR QL SCN: NEGATIVE
CREAT SERPL-MCNC: 0.9 MG/DL (ref 0.5–1)
EOSINOPHIL # BLD: 0 K/UL (ref 0.05–0.5)
EOSINOPHILS RELATIVE PERCENT: 0 % (ref 0–6)
ERYTHROCYTE [DISTWIDTH] IN BLOOD BY AUTOMATED COUNT: 17.4 % (ref 11.5–15)
ETHANOLAMINE SERPL-MCNC: <10 MG/DL (ref 0–0.08)
FENTANYL UR QL: NEGATIVE
GFR, ESTIMATED: 61 ML/MIN/1.73M2
GLUCOSE BLD-MCNC: 152 MG/DL (ref 74–99)
GLUCOSE BLD-MCNC: 193 MG/DL (ref 74–99)
GLUCOSE SERPL-MCNC: 136 MG/DL (ref 74–99)
HBA1C MFR BLD: 5.5 % (ref 4–5.6)
HCT VFR BLD AUTO: 28.6 % (ref 34–48)
HGB BLD-MCNC: 8.9 G/DL (ref 11.5–15.5)
IMM GRANULOCYTES # BLD AUTO: 0.12 K/UL (ref 0–0.58)
IMM GRANULOCYTES NFR BLD: 2 % (ref 0–5)
LYMPHOCYTES NFR BLD: 1.59 K/UL (ref 1.5–4)
LYMPHOCYTES RELATIVE PERCENT: 24 % (ref 20–42)
MCH RBC QN AUTO: 29.1 PG (ref 26–35)
MCHC RBC AUTO-ENTMCNC: 31.1 G/DL (ref 32–34.5)
MCV RBC AUTO: 93.5 FL (ref 80–99.9)
METHADONE UR QL: NEGATIVE
MONOCYTES NFR BLD: 0.69 K/UL (ref 0.1–0.95)
MONOCYTES NFR BLD: 11 % (ref 2–12)
NEUTROPHILS NFR BLD: 63 % (ref 43–80)
NEUTS SEG NFR BLD: 4.12 K/UL (ref 1.8–7.3)
OPIATES UR QL SCN: POSITIVE
OXYCODONE UR QL SCN: NEGATIVE
PCP UR QL SCN: NEGATIVE
PLATELET # BLD AUTO: 245 K/UL (ref 130–450)
PMV BLD AUTO: 10.9 FL (ref 7–12)
POTASSIUM SERPL-SCNC: 4.5 MMOL/L (ref 3.5–5)
RBC # BLD AUTO: 3.06 M/UL (ref 3.5–5.5)
SODIUM SERPL-SCNC: 143 MMOL/L (ref 132–146)
TEST INFORMATION: ABNORMAL
WBC OTHER # BLD: 6.5 K/UL (ref 4.5–11.5)

## 2024-08-07 PROCEDURE — 2580000003 HC RX 258

## 2024-08-07 PROCEDURE — 2140000000 HC CCU INTERMEDIATE R&B

## 2024-08-07 PROCEDURE — 82962 GLUCOSE BLOOD TEST: CPT

## 2024-08-07 PROCEDURE — 6360000004 HC RX CONTRAST MEDICATION: Performed by: RADIOLOGY

## 2024-08-07 PROCEDURE — 80307 DRUG TEST PRSMV CHEM ANLYZR: CPT

## 2024-08-07 PROCEDURE — 93005 ELECTROCARDIOGRAM TRACING: CPT

## 2024-08-07 PROCEDURE — 83036 HEMOGLOBIN GLYCOSYLATED A1C: CPT

## 2024-08-07 PROCEDURE — G0480 DRUG TEST DEF 1-7 CLASSES: HCPCS

## 2024-08-07 PROCEDURE — 36415 COLL VENOUS BLD VENIPUNCTURE: CPT

## 2024-08-07 PROCEDURE — 6370000000 HC RX 637 (ALT 250 FOR IP)

## 2024-08-07 PROCEDURE — 80048 BASIC METABOLIC PNL TOTAL CA: CPT

## 2024-08-07 PROCEDURE — 6360000002 HC RX W HCPCS

## 2024-08-07 PROCEDURE — 70496 CT ANGIOGRAPHY HEAD: CPT

## 2024-08-07 PROCEDURE — 85025 COMPLETE CBC W/AUTO DIFF WBC: CPT

## 2024-08-07 RX ADMIN — ACETAMINOPHEN 650 MG: 325 TABLET ORAL at 10:42

## 2024-08-07 RX ADMIN — LABETALOL HYDROCHLORIDE 10 MG: 5 INJECTION INTRAVENOUS at 20:53

## 2024-08-07 RX ADMIN — LABETALOL HYDROCHLORIDE 10 MG: 5 INJECTION INTRAVENOUS at 14:09

## 2024-08-07 RX ADMIN — SODIUM CHLORIDE: 9 INJECTION, SOLUTION INTRAVENOUS at 10:44

## 2024-08-07 RX ADMIN — SODIUM CHLORIDE: 9 INJECTION, SOLUTION INTRAVENOUS at 17:17

## 2024-08-07 RX ADMIN — ACETAMINOPHEN 650 MG: 325 TABLET ORAL at 22:38

## 2024-08-07 RX ADMIN — BUTALBITAL, ACETAMINOPHEN AND CAFFEINE 1 TABLET: 325; 50; 40 TABLET ORAL at 07:37

## 2024-08-07 RX ADMIN — BUTALBITAL, ACETAMINOPHEN AND CAFFEINE 1 TABLET: 325; 50; 40 TABLET ORAL at 15:19

## 2024-08-07 RX ADMIN — LABETALOL HYDROCHLORIDE 10 MG: 5 INJECTION INTRAVENOUS at 04:25

## 2024-08-07 RX ADMIN — CARVEDILOL 25 MG: 6.25 TABLET, FILM COATED ORAL at 17:12

## 2024-08-07 RX ADMIN — ACETAMINOPHEN 650 MG: 325 TABLET ORAL at 03:13

## 2024-08-07 RX ADMIN — ISOSORBIDE MONONITRATE 30 MG: 30 TABLET, EXTENDED RELEASE ORAL at 07:37

## 2024-08-07 RX ADMIN — HYDRALAZINE HYDROCHLORIDE 10 MG: 20 INJECTION INTRAMUSCULAR; INTRAVENOUS at 18:28

## 2024-08-07 RX ADMIN — CARVEDILOL 25 MG: 6.25 TABLET, FILM COATED ORAL at 07:36

## 2024-08-07 RX ADMIN — SODIUM CHLORIDE, PRESERVATIVE FREE 10 ML: 5 INJECTION INTRAVENOUS at 20:54

## 2024-08-07 RX ADMIN — LABETALOL HYDROCHLORIDE 10 MG: 5 INJECTION INTRAVENOUS at 00:02

## 2024-08-07 RX ADMIN — IOPAMIDOL 75 ML: 755 INJECTION, SOLUTION INTRAVENOUS at 13:03

## 2024-08-07 RX ADMIN — LEVETIRACETAM 500 MG: 500 TABLET, FILM COATED ORAL at 07:36

## 2024-08-07 RX ADMIN — LEVETIRACETAM 500 MG: 500 TABLET, FILM COATED ORAL at 20:53

## 2024-08-07 RX ADMIN — BUTALBITAL, ACETAMINOPHEN AND CAFFEINE 1 TABLET: 325; 50; 40 TABLET ORAL at 03:13

## 2024-08-07 RX ADMIN — SENNOSIDES AND DOCUSATE SODIUM 1 TABLET: 50; 8.6 TABLET ORAL at 07:36

## 2024-08-07 RX ADMIN — LABETALOL HYDROCHLORIDE 10 MG: 5 INJECTION INTRAVENOUS at 17:11

## 2024-08-07 RX ADMIN — ACETAMINOPHEN 650 MG: 325 TABLET ORAL at 15:19

## 2024-08-07 RX ADMIN — SENNOSIDES AND DOCUSATE SODIUM 1 TABLET: 50; 8.6 TABLET ORAL at 20:53

## 2024-08-07 RX ADMIN — ROSUVASTATIN 5 MG: 10 TABLET, FILM COATED ORAL at 07:37

## 2024-08-07 RX ADMIN — HYDRALAZINE HYDROCHLORIDE 10 MG: 20 INJECTION INTRAMUSCULAR; INTRAVENOUS at 02:27

## 2024-08-07 ASSESSMENT — ENCOUNTER SYMPTOMS
GASTROINTESTINAL NEGATIVE: 1
RESPIRATORY NEGATIVE: 1
ALLERGIC/IMMUNOLOGIC NEGATIVE: 1
EYES NEGATIVE: 1
COLOR CHANGE: 1

## 2024-08-07 ASSESSMENT — PAIN SCALES - GENERAL
PAINLEVEL_OUTOF10: 4
PAINLEVEL_OUTOF10: 5
PAINLEVEL_OUTOF10: 5

## 2024-08-07 ASSESSMENT — PAIN DESCRIPTION - FREQUENCY: FREQUENCY: CONTINUOUS

## 2024-08-07 ASSESSMENT — PAIN DESCRIPTION - LOCATION: LOCATION: HAND;EYE

## 2024-08-07 ASSESSMENT — PAIN DESCRIPTION - DESCRIPTORS: DESCRIPTORS: ACHING;SORE;THROBBING

## 2024-08-07 ASSESSMENT — PAIN - FUNCTIONAL ASSESSMENT: PAIN_FUNCTIONAL_ASSESSMENT: PREVENTS OR INTERFERES SOME ACTIVE ACTIVITIES AND ADLS

## 2024-08-07 ASSESSMENT — PAIN DESCRIPTION - PAIN TYPE: TYPE: ACUTE PAIN

## 2024-08-07 ASSESSMENT — PAIN DESCRIPTION - ORIENTATION: ORIENTATION: POSTERIOR;LEFT;RIGHT

## 2024-08-07 NOTE — PROGRESS NOTES
4 Eyes Skin Assessment     NAME:  Mercy Simmons  YOB: 1940  MEDICAL RECORD NUMBER:  [id number]    The patient is being assessed for  Admission    I agree that at least one RN has performed a thorough Head to Toe Skin Assessment on the patient. ALL assessment sites listed below have been assessed.      Areas assessed by both nurses:    Head, Face, Ears, Shoulders, Back, Chest, Arms, Elbows, Hands, Sacrum. Buttock, Coccyx, Ischium, and Legs. Feet and Heels        Does the Patient have a Wound? No noted wound(s)       Albert Prevention initiated by RN: Yes  Wound Care Orders initiated by RN: No    Pressure Injury (Stage 3,4, Unstageable, DTI, NWPT, and Complex wounds) if present, place Wound referral order by RN under : No    New Ostomies, if present place, Ostomy referral order under : No     Nurse 1 eSignature: Electronically signed by Jaci Langston RN on 8/7/24 at 5:41 PM EDT    **SHARE this note so that the co-signing nurse can place an eSignature**    Nurse 2 eSignature: {Esignature:[id number]}

## 2024-08-07 NOTE — DISCHARGE SUMMARY
3/11/2015      levothyroxine (SYNTHROID) 25 MCG tablet Take 1 tablet by mouth Daily             TRAUMA SERVICES DISCHARGE INSTRUCTIONS    Call [phone], option 2, for any questions/concerns and for follow-up appointment in 2 week(s).    Please follow the instructions checked below:    During the course of your workup, we identified an incidental finding of:  right meningioma  Please follow-up with your primary care provider.    ACTIVITY INSTRUCTIONS  Increase activity as tolerated  No heavy lifting or strenuous activity  Take your incentive spirometer home and use 4-6 times/day   [x]  No driving until cleared by trauma, neurosurgery    WOUND/DRESSING INSTRUCTIONS:  You may shower.  No sitting in bath tub, hot tub or swimming until cleared by physician.  Ice to areas of pain for first 24 hours.  Heat to areas of pain after that.  Wash areas of lacerations/abrasions with soap & water.  Rinse well.  Pat dry with clean towel.  Apply thin layer of Bacitracin, Neosporin, or triple antibiotic cream to affected area 2-3 times per day.  Keep wounds clean and dry.    MEDICATION INSTRUCTIONS  Take medication as prescribed.  When taking pain medications, you may experience dizziness or drowsiness.  Do not drink alcohol or drive when taking these medications.  You may experience constipation while taking pain medication.  You may take over the counter stool softeners such as docusate (Colace), sennosides S (Senokot-S), or Miralax.   []  You may take Ibuprofen (over the counter) as directed for mild pain.     --You may take up to 800mg every 8 hours for pain, please take with food or milk.   [x]  You may take acetaminophen (Tylenol) products.  Do NOT take more than 4000mg of Tylenol in 24h.   []  Do not take any other acetaminophen (Tylenol) products if you are taking Percocet or Norco, as these contain Tylenol.   --Do NOT take more than 4000mg of Tylenol in 24h.    OPIOID MEDICATION INSTRUCTIONS  Read the medication guide  wax, and wipe up spills right away, especially on ceramic tile floors.  If you use a walker or cane, put rubber tips on it. If you use crutches, clean the bottoms of them regularly with an abrasive pad, such as steel wool.  Keep your house well lit, especially stairways, porches, and outside walkways. Use night-lights in areas such as hallways and washrooms. Add extra light switches or use remote switches (such as switches that go on or off when you clap your hands) to make it easier to turn lights on if you have to get up during the night.  Install sturdy handrails on stairways.  Move items in your cabinets so that the things you use a lot are on the lower shelves (about waist level).  Keep a cordless phone and a flashlight with new batteries by your bed. If possible, put a phone in each of the main rooms of your house, or carry a cell phone in case you fall and cannot reach a phone. Or, you can wear a device around your neck or wrist. You push a button that sends a signal for help.  Wear low-heeled shoes that fit well and give your feet good support. Use footwear with non-skid soles. Check the heels and soles of your shoes for wear. Repair or replace worn heels or soles.  Do not wear socks without shoes on wood floors.  Walk on the grass when the sidewalks are slippery. If you live in an area that gets snow and ice in the winter, sprinkle salt on slippery steps and sidewalks.    Preventing falls in the bath  Install grab bars and non-skid mats inside and outside your shower or tub and near the toilet and sinks.  Use shower chairs and bath benches.  Use a hand-held shower head that will allow you to sit while showering.  Get into a tub or shower by putting the weaker leg in first. Get out of a tub or shower with your strong side first.  Repair loose toilet seats and consider installing a raised toilet seat to make getting on and off the toilet easier.  Keep your washroom door unlocked while you are in the

## 2024-08-07 NOTE — PROGRESS NOTES
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)  Highest level of education:  Graduated from high school     Emotional:  []  Within normal limits     []  Dec mood     []  Depression  [x]  Anxiety/Getting   []  Fatigue    []  Hallucination     []  Delusion  []  Other  Motivation:    [x]  High    []  Usual  []  Low  Health attitude:     []  Excellent    []  Good    [x]  Fair    []  Poor     []  Couldn't say  Communication:  Speech:   [x]  WNL   []  Impaired Hearing:   [x]  WNL   []  Impaired           Vision:     [x]  WNL   []  Impaired Understanding:   [x]  WNL   []  Impaired  Strength:   []  WNL   [x]  Weak/Generalized weakness   Exercise:   []  Frequent    []  Occasional    [x]  None   What type of exercise?    Balance:   []  WNL   [x]  Impaired  # falls in last month:  0  #falls in the last 6 months:  0  Mobility:  walk inside      []  Independent    [x]  Slow    []  Assisted    []  Can't         Walk outside   []  Independent    [x]  Slow    []  Assisted    []  Can't         Transfers         []  Independent    [x]  Slow    []  Assisted    []  Can't                    Bed (in/out)     []  Independent    [x]  Slow    []  Assisted    []  Can't                    Aid use            []  Independent    [x]  Slow    []  Assisted    []  Can't   Nutrition:  Weight:  []  Normal    []  Under    [x]  Over    []  Obese   []  Dietary consult           Appetite: []  WNL    []  Fair    [x]  Poor         Patient reported symptoms of dysphagia?  No Nurse reported? No         Patient passed a bedside swallow if  Progress Note    Patient ID: Sasha is a 73 year old female  Chief Complaint  Chief Complaint   Patient presents with   • Sore Throat     History of Present Illness  Problem List Items Addressed This Visit      ENT   Sore throat - Primary      Sore throat and pain with swallowing started Sunday into Monday and then a mostly dry cough, felt hot, no chills, body aches, tired  Took 2 covid tests and negative 2 days ago and today    had 5/31 and he is not sick now   Went to outdoor swim meet but sitting close and no masks and went out to eat     Check throat culture, strep poc, covid pcr  z pack and gargle with warm salt water and take tylenol          Relevant Medications   azithromycin (Zithromax Z-Florentino) 250 MG tablet   Other Relevant Orders   2019 NOVEL CORONAVIRUS (SARS-COV-2)   POCT RAPID STREP A   THROAT, BACTERIAL CULTURE          History  Current Outpatient Medications   Medication Sig Dispense Refill   • azithromycin (Zithromax Z-Florentino) 250 MG tablet 2 tablets day one, then 1 tablet days 2-5 6 tablet 0   • benzonatate (TESSALON PERLES) 200 MG capsule Take 1 capsule by mouth 3 times daily as needed for Cough. 30 capsule 0   • mometasone (ELOCON) 0.1 % cream Apply 1 application topically daily. Use sparingly 45 g 0   • zolpidem (AMBIEN) 10 MG tablet Take 1 tablet by mouth nightly as needed for Sleep. 30 tablet 3   • naproxen (NAPROSYN) 500 MG tablet Take 1 tablet by mouth 2 times daily (with meals). 40 tablet 1   • losartan (COZAAR) 50 MG tablet Take 1 tablet by mouth daily. 90 tablet 3   • Melatonin 10 MG Tab Take by mouth nightly.     • estrogens, conjugated (Premarin) 0.3 MG tablet Take 1 tablet by mouth every other day. 45 tablet 3   • calcium carbonate-vitamin D (CALTRATE+D) 600-400 MG-UNIT per tablet Take 1 tablet by mouth daily.      • magnesium oxide (MAG-OX) 400 MG tablet Take 400 mg by mouth daily.      • Omega-3 Fatty Acids (Fish Oil) 1200 MG capsule Take 1,200 mg by mouth daily.      • Multiple  Vitamins-Minerals (HAIR SKIN AND NAILS FORMULA PO) Take 1 tablet by mouth daily.      • Psyllium (METAMUCIL PO) Take 1 tablet by mouth daily.      • Zinc Sulfate 140 (50 Zn) MG Tab Take 1 tablet by mouth daily.      • Probiotic Product (PROBIOTIC-10) Cap Take 1 capsule by mouth daily.      • cycloSPORINE (RESTASIS) 0.05 % ophthalmic emulsion Place 1 drop into both eyes 2 times daily. Indications: pt has with her      • Cholecalciferol (Vitamin D) 50 mcg (2,000 units) tablet Take 2,000 Units by mouth daily.      • Ascorbic Acid (vitamin C) 500 MG chewable tablet Chew 500 mg by mouth daily.        No current facility-administered medications for this visit.       Allergies  ALLERGIES:   Allergen Reactions   • Asparagus   (Food Or Med) GI UPSET   • Cat Hair Extract Cough and Other (See Comments)   • Ciprofloxacin Hcl PRURITUS   • Clindamycin PRURITUS   • Hydrocodone-Acetaminophen VOMITING and CONSTIPATION   • Penicillins RASH   • Tramadol Other (See Comments)     Not sure   • Vancomycin PRURITUS       Immunization History   Administered Date(s) Administered   • COVID-19 12Y+ Pfizer-BioNtech - Requires Dilution 02/05/2021, 02/26/2021, 10/06/2021, 04/18/2022   • Influenza, high dose quadrivalent, preservative-free 10/31/2020, 10/13/2021   • Influenza, high dose seasonal, preservative-free 11/08/2019   • Influenza, injectable, quadrivalent 09/02/2012   • Influenza, injectable, quadrivalent, preservative-free 10/18/2016   • Influenza, seasonal, injectable, preservative free 11/18/2010   • Influenza, seasonal, injectable, trivalent 01/01/2009, 10/20/2015   • Pneumococcal Conjugate 13 Valent Vacc (Prevnar 13) 07/08/2015   • Pneumococcal Polysaccharide Vacc (Pneumovax 23) 07/08/2015, 07/01/2016, 08/16/2016   • TD Adult, Unspecified Formulation 08/21/2003   • Tdap 09/02/2012, 02/07/2020   • Zoster Shingles 10/12/2014, 01/01/2015       Screening schedule/checklist for next 5-10 years:  Health Maintenance Due   Topic Date Due    • Shingles Vaccine (1 of 2) 02/26/2015   • Pneumococcal Vaccine 65+ (3 - PCV) 08/16/2017        Family History  Family History   Problem Relation Age of Onset   • Stroke Mother         Plus maternal grandmother   • Arthritis Mother    • Cancer Mother    • Stroke Father    • Heart disease Father    • Alcohol Abuse Father    • High blood pressure Father    • Alcohol Abuse Sister    • Stroke Maternal Grandmother    • Suicide Brother      Social History  Social History     Socioeconomic History   • Marital status: /Civil Union     Spouse name: Not on file   • Number of children: 3   • Years of education: Not on file   • Highest education level: Not on file   Occupational History   • Occupation: retired--controller    Tobacco Use   • Smoking status: Never Smoker   • Smokeless tobacco: Never Used   Vaping Use   • Vaping Use: never used   Substance and Sexual Activity   • Alcohol use: Yes     Alcohol/week: 2.0 standard drinks     Types: 2 Glasses of wine per week     Comment: 1 wine 2-3 times per wk   • Drug use: No   • Sexual activity: Not Currently     Partners: Male   Other Topics Concern   •  Service Not Asked   • Blood Transfusions Not Asked   • Caffeine Concern Not Asked   • Occupational Exposure Not Asked   • Hobby Hazards Not Asked   • Sleep Concern Not Asked   • Stress Concern Not Asked   • Weight Concern Not Asked   • Special Diet Not Asked   • Back Care Not Asked   • Exercise No   • Bike Helmet Not Asked   • Seat Belt Not Asked   • Self-Exams Not Asked   Social History Narrative    7 grandchildren        Going to [facility] 2/3/2022        Going to [place] for a few weeks coming home and then going on another trip for a few weeks and in 2020 for the are going on a cruise around the world     Social Determinants of Health     Financial Resource Strain: Not on file   Food Insecurity: Not on file   Transportation Needs: Not on file   Physical Activity: Inactive   • Days of Exercise per Week: 0 days    • Minutes of Exercise per Session: 0 min   Stress: Not on file   Social Connections: Not on file   Intimate Partner Violence: Not At Risk   • Social Determinants: Intimate Partner Violence Past Fear: No   • Social Determinants: Intimate Partner Violence Current Fear: No     Past Medical History   Past Medical History:   Diagnosis Date   • Abdominal pain    • Acute pharyngitis 11/9/2021    Sl sore throat --check for strep as has had recently    • Acute recurrent streptococcal tonsillitis 10/7/2021   • Allergic rhinitis    • Allergy    • Anxiety 1/10/2022   • Arthritis    • Cataract 05/24/2012   • Counseling for travel 12/16/2019   • Dense breast tissue    • Essential (primary) hypertension Fall 2021   • Fall 03/05/2020   • Fibroadenosis of breast    • Goblet cell carcinoid (CMS/HCC)    • Need for pneumococcal vaccination 7/8/2015   • Osteopenia    • PONV (postoperative nausea and vomiting)    • Preop examination 4/7/2021    Pre-op exam for Dr Diaz for 4/24/2021---bladder prolapse --cystocele surgery/pelvic floor surgery , sling   EKG done in 1/2021--is fine  Check labs on 4/14/21-cbc, chromogranin A and cmp are already ordered by Dr Alcocer To have the covid test 3 days prior per Dr Diaz  No hx of bleeding, blood clots, chest pain, shortness of breath Nausea with anesthesia in the past,  though the anesthesia wit   • Rotator cuff disorder    • SNHL (sensorineural hearing loss)    • Sore throat 9/16/2021    Sl sore throat --check for strep as has had recently    • Strep pharyngitis 9/16/2021   • Umbilical hernia      Past Surgical History  Past Surgical History:   Procedure Laterality Date   • Adenoidectomy     • Appendectomy      carcinoid --Dr Quiñones   • Bladder surgery     • Cataract extraction, bilateral     • Cholecystectomy     • Colon surgery  March 2012   • Eye surgery  2012   • Hemicolectomy Right 03/08/2012    and part of the small intestine and lymph nodes--dr gaviria   • Hernia repair     •  Hysterectomy      ovaries are still in --44 yo   • No past surgeries  March 2012 April 2021    Other surgeries earlier   • Repair of rectocele     • Small intestine surgery  March 2012   • Tonsillectomy     • Tubal ligation     • Vaginal prolapse repair       Review of Systems  Review of Systems   All other systems reviewed and are negative.    Visit Vitals  /70   Pulse 72   Temp 98.5 °F (36.9 °C)   Ht 5' 4\" (1.626 m)   Wt 64.4 kg (142 lb)   LMP  (LMP Unknown)   BMI 24.37 kg/m²     Physical Exam  Physical Exam  Vitals reviewed.   Constitutional:       General: She is not in acute distress.     Appearance: Normal appearance. She is normal weight. She is not ill-appearing, toxic-appearing or diaphoretic.   HENT:      Head: Normocephalic and atraumatic.      Right Ear: Tympanic membrane and ear canal normal.      Left Ear: Tympanic membrane and ear canal normal.      Nose: No congestion or rhinorrhea.      Mouth/Throat:      Pharynx: No oropharyngeal exudate or posterior oropharyngeal erythema.      Comments: Only sl red   Eyes:      Extraocular Movements: Extraocular movements intact.   Cardiovascular:      Rate and Rhythm: Normal rate and regular rhythm.      Pulses: Normal pulses.   Pulmonary:      Effort: Pulmonary effort is normal.      Breath sounds: Normal breath sounds.   Musculoskeletal:      Right lower leg: No edema.      Left lower leg: No edema.   Lymphadenopathy:      Cervical: No cervical adenopathy.   Skin:     General: Skin is warm and dry.      Coloration: Skin is not jaundiced or pale.   Neurological:      General: No focal deficit present.      Mental Status: She is alert and oriented to person, place, and time. Mental status is at baseline.   Psychiatric:         Mood and Affect: Mood normal.         Behavior: Behavior normal.         Thought Content: Thought content normal.         Judgment: Judgment normal.       Assessment and Plan    Problem List Items Addressed This Visit        ENT     Sore throat - Primary     Sore throat and pain with swallowing started Sunday into Monday and then a mostly dry cough, felt hot, no chills, body aches, tired  Took 2 covid tests and negative 2 days ago and today    had 5/31 and he is not sick now   Went to outdoor swim meet but sitting close and no masks and went out to eat     Check throat culture, strep poc, covid pcr  z pack and gargle with warm salt water and take tylenol            Relevant Medications    azithromycin (Zithromax Z-Florentino) 250 MG tablet    benzonatate (TESSALON PERLES) 200 MG capsule    Other Relevant Orders    2019 NOVEL CORONAVIRUS (SARS-COV-2)    POCT RAPID STREP A (Completed)    THROAT, BACTERIAL CULTURE      Other Visit Diagnoses     Cough        Relevant Medications    benzonatate (TESSALON PERLES) 200 MG capsule

## 2024-08-07 NOTE — H&P
TRAUMA HISTORY & PHYSICAL  Attending/Surgical Resident/Advance Practice Nurse  8/6/2024  10:57 PM    PRIMARY SURVEY    CHIEF COMPLAINT:  Trauma consult.    Injury occurred just prior to arrival. Patient says she was sitting on couch watching tv, she got up and then the next remembers that she was on the floor.  She came to the hospital was found to have scattered subarachnoid hemorrhage.  She also have significant periorbital swelling and ecchymosis.  Currently, she also complains of head pain.  No other complaint.  She denies any vision problems.  She denies any double vision.  She she does endorse some nausea but no vomiting.    AIRWAY:   Airway Normal    EMS ETT Absent  Noisy respirations Absent  Retractions: Absent  Vomiting/bleeding: Absent    BREATHING:    Midaxillary breath sound left:  Normal  Midaxillary breath sound right:  Normal    Cough sound intensity:  good    FiO2:  Room air      CIRCULATION:   Femerol pulse intensity: Strong  Palpebral conjunctiva: white    Vitals:    08/06/24 2240   BP:    Pulse:    Resp: 26   Temp:    SpO2: 97%       Vitals:    08/06/24 2215 08/06/24 2220 08/06/24 2225 08/06/24 2240   BP: (!) 132/59 (!) 121/55     Pulse: (!) 109 (!) 109 (!) 109    Resp: 29 15 19 26   Temp:       TempSrc:       SpO2: 93% 96% 95% 97%        FAST EXAM: Deferred    Central Nervous System    GCS Initial 15 minutes   Eye  Motor  Verbal 4 - Opens eyes on own  6 - Follows simple motor commands  5 - Alert and oriented 4 - Opens eyes on own  6 - Follows simple motor commands  5 - Alert and oriented     Neuromuscular blockade: No  Pupil size:  Left 2 mm    Right 2 mm  Pupil reaction: Yes intraocular muscles in the    Wiggles fingers: Left Yes Right Yes  Wiggles toes: Left Yes   Right Yes    Hand grasp:   Left  Present      Right  Present  Plantar flexion: Left  Present      Right   Present    Loss of consciousness:  No     History Obtained From:  Patient & EMS  Private Medical Doctor: Clint Waters,  MD      Pre-exisiting Medical History:  yes    Conditions: CAD, CHF, diabetes, hyperlipidemia, hypertension    Medications:   Current Discharge Medication List        CONTINUE these medications which have NOT CHANGED    Details   rosuvastatin (CRESTOR) 5 MG tablet Take 1 tablet by mouth daily  Qty: 30 tablet, Refills: 3      lisinopril (PRINIVIL;ZESTRIL) 20 MG tablet TAKE 1 TABLET BY MOUTH ONCE DAILY FOR 90 DAYS      potassium chloride (KLOR-CON M) 20 MEQ extended release tablet Take 1 tablet by mouth 2 times daily (with meals)  Qty: 60 tablet, Refills: 3      furosemide (LASIX) 20 MG tablet Take 1 tablet by mouth daily  Qty: 60 tablet, Refills: 3      nitroGLYCERIN (NITROSTAT) 0.4 MG SL tablet Place 1 tablet under the tongue every 5 minutes as needed for Chest pain  Qty: 25 tablet, Refills: 3      mirtazapine (REMERON) 15 MG tablet Take 1 tablet by mouth nightly.  Qty: 30 tablet, Refills: 3      carvedilol (COREG) 25 MG tablet Take 1 tablet by mouth 2 times daily (with meals) Instructed to take with sip water am of procedure      calcium carbonate-vitamin D 600-200 MG-UNIT TABS Take 1 tablet by mouth daily. LD 3/11/2015      levothyroxine (SYNTHROID) 25 MCG tablet Take 1 tablet by mouth Daily      aspirin 81 MG tablet Take 1 tablet by mouth daily LD 3/11/2015      isosorbide mononitrate (IMDUR) 30 MG CR tablet Take 1 tablet by mouth daily.  Qty: 30 tablet, Refills: 12                Allergies: None     Social History:   Tobacco use:  none  Alcohol use:  none  Illicit drug use:  no history of illicit drug use    Past Surgical History:    Past Surgical History:   Procedure Laterality Date    APPENDECTOMY      CARDIOVASCULAR STRESS TEST  2012  Lexiscan nuclear stress test done at [facility].    No evidence of scars or stress-induced ischemia with an EF of 71%.    CATARACT REMOVAL WITH IMPLANT  2009 Right.    2012 Left.     SECTION      x 2.    COLONOSCOPY  2013    CORONARY ANGIOPLASTY WITH STENT

## 2024-08-07 NOTE — PROCEDURES
PROCEDURE NOTE  Date: 8/6/2024   Name: Mercy Simmons  YOB: 1940    Procedures    Patients gfr is 38, will need waiver or scans wo contrast

## 2024-08-07 NOTE — PROGRESS NOTES
08/07/24 0059 -- (!) 112 17 --   08/07/24 0058 -- (!) 113 17 --   08/07/24 0057 -- (!) 112 18 --   08/07/24 0056 -- (!) 111 15 --   08/07/24 0055 -- (!) 111 24 --   08/07/24 0054 -- (!) 111 24 --   08/07/24 0053 -- (!) 111 17 --   08/07/24 0052 -- (!) 111 24 --   08/07/24 0051 -- (!) 111 25 --   08/07/24 0050 134/60 (!) 110 24 --   08/07/24 0049 -- (!) 112 24 --   08/07/24 0048 -- (!) 110 24 --   08/07/24 0047 -- (!) 110 20 --   08/07/24 0046 -- (!) 110 26 --   08/07/24 0045 -- (!) 110 22 --   08/07/24 0044 -- (!) 110 23 --   08/07/24 0043 -- (!) 108 24 --   08/07/24 0042 -- (!) 112 19 --   08/07/24 0041 -- (!) 110 25 --   08/07/24 0040 134/62 (!) 110 23 --   08/07/24 0039 -- (!) 109 24 --   08/07/24 0038 -- (!) 110 17 --   08/07/24 0037 -- (!) 111 21 --   08/07/24 0036 -- (!) 110 19 --   08/07/24 0035 -- (!) 108 18 --   08/07/24 0034 -- (!) 109 22 --   08/07/24 0033 -- (!) 109 17 --   08/07/24 0032 -- (!) 109 17 --   08/07/24 0031 -- (!) 109 19 --   08/07/24 0030 134/61 (!) 108 17 --   08/07/24 0029 -- (!) 110 16 --   08/07/24 0028 -- (!) 109 20 --   08/07/24 0027 -- (!) 108 23 --   08/07/24 0026 -- (!) 108 19 --   08/07/24 0025 -- (!) 110 18 --   08/07/24 0024 -- (!) 110 20 --   08/07/24 0023 -- (!) 108 17 --   08/07/24 0022 -- (!) 109 21 --   08/07/24 0021 -- (!) 107 15 --   08/07/24 0020 -- (!) 107 20 --   08/07/24 0019 -- (!) 107 22 98 %   08/07/24 0018 -- (!) 106 20 96 %   08/07/24 0017 -- (!) 106 20 96 %   08/07/24 0016 -- (!) 105 27 96 %   08/07/24 0015 (!) 117/53 (!) 106 22 96 %   08/07/24 0014 -- (!) 105 24 96 %   08/07/24 0013 -- (!) 106 25 96 %   08/07/24 0012 -- (!) 105 25 95 %   08/07/24 0011 -- (!) 105 26 96 %   08/07/24 0010 -- (!) 104 22 96 %   08/07/24 0009 -- (!) 105 26 95 %   08/07/24 0008 -- (!) 104 30 96 %   08/07/24 0007 -- (!) 105 28 --   08/07/24 0006 -- (!) 105 23 --   08/07/24 0005 134/79 (!) 113 19 96 %   08/07/24 0004 -- (!) 112 18 --   08/07/24 0003 -- (!) 111 26 --   08/07/24 0002  OT    Electronically signed by Robert Stephen DO on 8/7/24 at 7:20 AM EDT

## 2024-08-07 NOTE — PROGRESS NOTES
Patient arrived to PCCU. VSS. Patient oriented to room and use of call light.     Jaci Langston RN

## 2024-08-07 NOTE — PROGRESS NOTES
GERIATRIC TRAUMA RESIDENT INITIAL ASSESSMENT    Chief Complaint   Patient presents with    Fall     Patient unsure of what happened. Patient states she does not remember falling but woke up with large hematoma above right eye. Patient states the last thing she remembers was around noon when she was watching TV. Patient takes aspirin anabel        Mechanism of Injury:  Fall while trying to stand up    Loss of consciousness:  Yes/Brief     HPI:  Ms. Mercy Simmons is an 83-year-old woman with PMH of CAD, HTN, HLD, heart murmur, type 2 diabetes who presented to the ED s/p fall. Patient states that she was sitting on her couch watching TV when she tried to stand up and the next thing she knows she was on the floor. The patient denies prodromal symptoms prior to her fall. The patient does not remember what happened. When seen this morning the patient had no acute complaint.     Past Medical History:   Diagnosis Date    ACL (anterior cruciate ligament) rupture     Acute ischemic colitis (HCC) 4/30/2012    Anxiety     Arthritis     CAD (coronary artery disease)     Cardiomyopathy (HCC)     Carotid artery stenosis 1/5/2012     Carotid ultrasound showed 50-69% stenosis of the middle segment of the left internal carotid artery.    Chest pain 4/8/2015    CHF (congestive heart failure) (HCC)     Diabetes mellitus (HCC)     no meds    Diabetes mellitus type 2, controlled (HCC) 1/18/2013    Diabetic retinopathy (HCC)     Generalized osteoarthritis of multiple sites     Heart murmur     History of tobacco abuse     Hypercalcemia     Hypercholesterolemia     Hyperlipidemia     Hypertension     LV dysfunction     Severe.    Mitral regurgitation     mild    Obesity     Preoperative clearance 2-13-15    Cardiac clearance for OR 3-18-15 from Dr. Estes in EPIC letters    Rectal bleeding 4/20/2012 4/26/2012 Exploratory laparotomy, lysis of excessive adhesions, left hemicolectomy and intraoperative colonoscopy:  Findings consistnet with  Robert, DO        polyethylene glycol (GLYCOLAX) packet 17 g  17 g Oral Daily Hailee, Bridgettdin, DO        sennosides-docusate sodium (SENOKOT-S) 8.6-50 MG tablet 1 tablet  1 tablet Oral BID Walterwaduong, Bridgettdin, DO   1 tablet at 08/07/24 0736    0.9 % sodium chloride infusion   IntraVENous Continuous Hailee, Robert,  mL/hr at 08/07/24 0720 Rate Verify at 08/07/24 0720    hydrALAZINE (APRESOLINE) injection 10 mg  10 mg IntraVENous Q4H PRN Hailee, Bridgettdin, DO   10 mg at 08/07/24 0227    labetalol (NORMODYNE;TRANDATE) injection 10 mg  10 mg IntraVENous Q4H Walterwan, Bridgettdin, DO   10 mg at 08/07/24 0425    butalbital-acetaminophen-caffeine (FIORICET, ESGIC) per tablet 1 tablet  1 tablet Oral Q4H PRN Hailee, Robert, DO   1 tablet at 08/07/24 0737    glucose chewable tablet 16 g  4 tablet Oral PRN Hailee, Bridgettdin, DO        dextrose bolus 10% 125 mL  125 mL IntraVENous PRN Hailee, Robert, DO        Or    dextrose bolus 10% 250 mL  250 mL IntraVENous PRN Hailee, Bridgettdin, DO        glucagon injection 1 mg  1 mg SubCUTAneous PRN Hailee, Bridgettdin, DO        dextrose 10 % infusion   IntraVENous Continuous PRN Hailee, Bridgettdin, DO        insulin lispro (HUMALOG,ADMELOG) injection vial 0-8 Units  0-8 Units SubCUTAneous TID  Robert Stephen, DO        insulin lispro (HUMALOG,ADMELOG) injection vial 0-4 Units  0-4 Units SubCUTAneous Nightly Walterwan, Bridgettdin, DO        carvedilol (COREG) tablet 25 mg  25 mg Oral BID WC Hailee, Bridgettdin, DO   25 mg at 08/07/24 0736    levothyroxine (SYNTHROID) tablet 25 mcg  25 mcg Oral Daily Walterwaduong, Bridgettdin, DO        mirtazapine (REMERON) tablet 15 mg  15 mg Oral PRN Hailee, Bridgettdin, DO        rosuvastatin (CRESTOR) tablet 5 mg  5 mg Oral Daily Robert Stephen DO   5 mg at 08/07/24 0737    isosorbide mononitrate (IMDUR) extended release tablet 30 mg  30 mg Oral Daily Robert Stephen DO   30 mg at 08/07/24 0737     Current Outpatient Medications   Medication

## 2024-08-07 NOTE — CONSULTS
Neurosurgery Consult Note      CHIEF COMPLAINT: Fall from a standing height with traumatic subarachnoid hemorrhage    HPI:Injury occurred just prior to arrival. Patient says she was sitting on couch watching tv, she got up and then the next remembers that she was on the floor.  She came to the hospital was found to have scattered subarachnoid hemorrhage.  She also have significant periorbital swelling and ecchymosis.  Currently, she also complains of head pain.  No other complaint.  She denies any vision problems.  She denies any double vision.  She she does endorse some nausea but no vomiting.     Patient seen and evaluated in the emergency room laying on a gurney awake and alert bilateral periorbital ecchymosis friends were at the bedside she was counseled extensively had a Beatriz Coma Scale 15    Past Medical History:   Diagnosis Date    ACL (anterior cruciate ligament) rupture     Acute ischemic colitis ([facility]) 4/30/2012    Anxiety     Arthritis     CAD (coronary artery disease)     Cardiomyopathy ([facility])     Carotid artery stenosis 1/5/2012     Carotid ultrasound showed 50-69% stenosis of the middle segment of the left internal carotid artery.    Chest pain 4/8/2015    CHF (congestive heart failure) ([facility])     Diabetes mellitus ([facility])     no meds    Diabetes mellitus type 2, controlled ([facility]) 1/18/2013    Diabetic retinopathy ([facility])     Generalized osteoarthritis of multiple sites     Heart murmur     History of tobacco abuse     Hypercalcemia     Hypercholesterolemia     Hyperlipidemia     Hypertension     LV dysfunction     Severe.    Mitral regurgitation     mild    Obesity     Preoperative clearance 2-13-15    Cardiac clearance for OR 3-18-15 from Dr. Estes in EPIC letters    Rectal bleeding 4/20/2012 4/26/2012 Exploratory laparotomy, lysis of excessive adhesions, left hemicolectomy and intraoperative colonoscopy:  Findings consistnet with ischemic colitis, segmental, 50 cm from the anal verge. Pathology  01/07/2022 11:24 AM    LABGLOM 61 08/07/2024 07:25 AM    GLUCOSE 136 08/07/2024 07:25 AM    GLUCOSE 133 05/02/2012 04:10 PM     PT/INR:    Lab Results   Component Value Date/Time    PROTIME 12.5 08/06/2024 07:27 PM    PROTIME 12.6 04/20/2012 05:35 AM    INR 1.1 08/06/2024 07:27 PM       IMPRESSION:   fall from a standing height with traumatic subarachnoid hemorrhage and facial abrasions    RECOMMENDATIONS: Follow routine head injury  protocols repeat the head CT serial neurochecks no neurosurgical intervention recommended at this time agree with [facility]    Thank you again for this consultation.      Cameron Mcgarry MD  8/7/2024

## 2024-08-08 PROBLEM — I62.9 INTRACRANIAL HEMORRHAGE (HCC): Status: ACTIVE | Noted: 2024-08-08

## 2024-08-08 LAB
ANION GAP SERPL CALCULATED.3IONS-SCNC: 11 MMOL/L (ref 7–16)
BASOPHILS # BLD: 0.01 K/UL (ref 0–0.2)
BASOPHILS NFR BLD: 0 % (ref 0–2)
BUN SERPL-MCNC: 19 MG/DL (ref 6–23)
CALCIUM SERPL-MCNC: 8.9 MG/DL (ref 8.6–10.2)
CHLORIDE SERPL-SCNC: 111 MMOL/L (ref 98–107)
CO2 SERPL-SCNC: 18 MMOL/L (ref 22–29)
CREAT SERPL-MCNC: 1.1 MG/DL (ref 0.5–1)
EKG ATRIAL RATE: 115 BPM
EKG ATRIAL RATE: 89 BPM
EKG P AXIS: 50 DEGREES
EKG P AXIS: 65 DEGREES
EKG P-R INTERVAL: 150 MS
EKG P-R INTERVAL: 150 MS
EKG Q-T INTERVAL: 334 MS
EKG Q-T INTERVAL: 362 MS
EKG QRS DURATION: 70 MS
EKG QRS DURATION: 76 MS
EKG QTC CALCULATION (BAZETT): 440 MS
EKG QTC CALCULATION (BAZETT): 462 MS
EKG R AXIS: 14 DEGREES
EKG R AXIS: 38 DEGREES
EKG T AXIS: 38 DEGREES
EKG T AXIS: 56 DEGREES
EKG VENTRICULAR RATE: 115 BPM
EKG VENTRICULAR RATE: 89 BPM
EOSINOPHIL # BLD: 0 K/UL (ref 0.05–0.5)
EOSINOPHILS RELATIVE PERCENT: 0 % (ref 0–6)
ERYTHROCYTE [DISTWIDTH] IN BLOOD BY AUTOMATED COUNT: 17.5 % (ref 11.5–15)
GFR, ESTIMATED: 51 ML/MIN/1.73M2
GLUCOSE BLD-MCNC: 137 MG/DL (ref 74–99)
GLUCOSE BLD-MCNC: 160 MG/DL (ref 74–99)
GLUCOSE BLD-MCNC: 165 MG/DL (ref 74–99)
GLUCOSE BLD-MCNC: 184 MG/DL (ref 74–99)
GLUCOSE SERPL-MCNC: 120 MG/DL (ref 74–99)
HCT VFR BLD AUTO: 24 % (ref 34–48)
HGB BLD-MCNC: 7.2 G/DL (ref 11.5–15.5)
IMM GRANULOCYTES # BLD AUTO: 0.09 K/UL (ref 0–0.58)
IMM GRANULOCYTES NFR BLD: 2 % (ref 0–5)
LYMPHOCYTES NFR BLD: 1.94 K/UL (ref 1.5–4)
LYMPHOCYTES RELATIVE PERCENT: 36 % (ref 20–42)
MCH RBC QN AUTO: 28.1 PG (ref 26–35)
MCHC RBC AUTO-ENTMCNC: 30 G/DL (ref 32–34.5)
MCV RBC AUTO: 93.8 FL (ref 80–99.9)
MONOCYTES NFR BLD: 0.6 K/UL (ref 0.1–0.95)
MONOCYTES NFR BLD: 11 % (ref 2–12)
NEUTROPHILS NFR BLD: 52 % (ref 43–80)
NEUTS SEG NFR BLD: 2.83 K/UL (ref 1.8–7.3)
PLATELET # BLD AUTO: 223 K/UL (ref 130–450)
PMV BLD AUTO: 11 FL (ref 7–12)
POTASSIUM SERPL-SCNC: 3.9 MMOL/L (ref 3.5–5)
RBC # BLD AUTO: 2.56 M/UL (ref 3.5–5.5)
SODIUM SERPL-SCNC: 140 MMOL/L (ref 132–146)
WBC OTHER # BLD: 5.5 K/UL (ref 4.5–11.5)

## 2024-08-08 PROCEDURE — 36415 COLL VENOUS BLD VENIPUNCTURE: CPT

## 2024-08-08 PROCEDURE — 97530 THERAPEUTIC ACTIVITIES: CPT

## 2024-08-08 PROCEDURE — 6370000000 HC RX 637 (ALT 250 FOR IP)

## 2024-08-08 PROCEDURE — 99232 SBSQ HOSP IP/OBS MODERATE 35: CPT | Performed by: STUDENT IN AN ORGANIZED HEALTH CARE EDUCATION/TRAINING PROGRAM

## 2024-08-08 PROCEDURE — 97535 SELF CARE MNGMENT TRAINING: CPT

## 2024-08-08 PROCEDURE — 80048 BASIC METABOLIC PNL TOTAL CA: CPT

## 2024-08-08 PROCEDURE — 93010 ELECTROCARDIOGRAM REPORT: CPT | Performed by: INTERNAL MEDICINE

## 2024-08-08 PROCEDURE — 2140000000 HC CCU INTERMEDIATE R&B

## 2024-08-08 PROCEDURE — 97165 OT EVAL LOW COMPLEX 30 MIN: CPT

## 2024-08-08 PROCEDURE — 85025 COMPLETE CBC W/AUTO DIFF WBC: CPT

## 2024-08-08 PROCEDURE — 97161 PT EVAL LOW COMPLEX 20 MIN: CPT

## 2024-08-08 PROCEDURE — 6360000002 HC RX W HCPCS

## 2024-08-08 PROCEDURE — 2580000003 HC RX 258

## 2024-08-08 PROCEDURE — 82962 GLUCOSE BLOOD TEST: CPT

## 2024-08-08 RX ORDER — MINERAL OIL AND WHITE PETROLATUM 150; 830 MG/G; MG/G
OINTMENT OPHTHALMIC 4 TIMES DAILY
Status: DISCONTINUED | OUTPATIENT
Start: 2024-08-08 | End: 2024-08-10 | Stop reason: HOSPADM

## 2024-08-08 RX ORDER — MINERAL OIL AND WHITE PETROLATUM 150; 830 MG/G; MG/G
OINTMENT OPHTHALMIC 4 TIMES DAILY
Status: DISCONTINUED | OUTPATIENT
Start: 2024-08-08 | End: 2024-08-08

## 2024-08-08 RX ADMIN — ROSUVASTATIN 5 MG: 10 TABLET, FILM COATED ORAL at 08:22

## 2024-08-08 RX ADMIN — MINERAL OIL, WHITE PETROLATUM: .03; .94 OINTMENT OPHTHALMIC at 19:40

## 2024-08-08 RX ADMIN — LABETALOL HYDROCHLORIDE 10 MG: 5 INJECTION INTRAVENOUS at 07:42

## 2024-08-08 RX ADMIN — LEVOTHYROXINE SODIUM 25 MCG: 0.03 TABLET ORAL at 05:51

## 2024-08-08 RX ADMIN — LEVETIRACETAM 500 MG: 500 TABLET, FILM COATED ORAL at 19:41

## 2024-08-08 RX ADMIN — CARVEDILOL 25 MG: 6.25 TABLET, FILM COATED ORAL at 07:42

## 2024-08-08 RX ADMIN — ACETAMINOPHEN 650 MG: 325 TABLET ORAL at 19:41

## 2024-08-08 RX ADMIN — MINERAL OIL, WHITE PETROLATUM: .03; .94 OINTMENT OPHTHALMIC at 11:39

## 2024-08-08 RX ADMIN — LABETALOL HYDROCHLORIDE 10 MG: 5 INJECTION INTRAVENOUS at 19:41

## 2024-08-08 RX ADMIN — SENNOSIDES AND DOCUSATE SODIUM 1 TABLET: 50; 8.6 TABLET ORAL at 19:41

## 2024-08-08 RX ADMIN — POLYETHYLENE GLYCOL 3350 17 G: 17 POWDER, FOR SOLUTION ORAL at 08:21

## 2024-08-08 RX ADMIN — LABETALOL HYDROCHLORIDE 10 MG: 5 INJECTION INTRAVENOUS at 23:27

## 2024-08-08 RX ADMIN — ACETAMINOPHEN 650 MG: 325 TABLET ORAL at 04:30

## 2024-08-08 RX ADMIN — SODIUM CHLORIDE, PRESERVATIVE FREE 10 ML: 5 INJECTION INTRAVENOUS at 19:41

## 2024-08-08 RX ADMIN — SENNOSIDES AND DOCUSATE SODIUM 1 TABLET: 50; 8.6 TABLET ORAL at 08:21

## 2024-08-08 RX ADMIN — ACETAMINOPHEN 650 MG: 325 TABLET ORAL at 08:21

## 2024-08-08 RX ADMIN — MINERAL OIL, WHITE PETROLATUM: .03; .94 OINTMENT OPHTHALMIC at 16:33

## 2024-08-08 RX ADMIN — ACETAMINOPHEN 650 MG: 325 TABLET ORAL at 15:27

## 2024-08-08 RX ADMIN — LABETALOL HYDROCHLORIDE 10 MG: 5 INJECTION INTRAVENOUS at 11:39

## 2024-08-08 RX ADMIN — LEVETIRACETAM 500 MG: 500 TABLET, FILM COATED ORAL at 08:22

## 2024-08-08 RX ADMIN — LABETALOL HYDROCHLORIDE 10 MG: 5 INJECTION INTRAVENOUS at 04:30

## 2024-08-08 RX ADMIN — LABETALOL HYDROCHLORIDE 10 MG: 5 INJECTION INTRAVENOUS at 15:27

## 2024-08-08 RX ADMIN — ISOSORBIDE MONONITRATE 30 MG: 30 TABLET, EXTENDED RELEASE ORAL at 08:21

## 2024-08-08 RX ADMIN — SODIUM CHLORIDE, PRESERVATIVE FREE 10 ML: 5 INJECTION INTRAVENOUS at 08:22

## 2024-08-08 RX ADMIN — CARVEDILOL 25 MG: 6.25 TABLET, FILM COATED ORAL at 16:33

## 2024-08-08 RX ADMIN — LABETALOL HYDROCHLORIDE 10 MG: 5 INJECTION INTRAVENOUS at 00:14

## 2024-08-08 ASSESSMENT — PAIN - FUNCTIONAL ASSESSMENT
PAIN_FUNCTIONAL_ASSESSMENT: PREVENTS OR INTERFERES SOME ACTIVE ACTIVITIES AND ADLS
PAIN_FUNCTIONAL_ASSESSMENT: ACTIVITIES ARE NOT PREVENTED

## 2024-08-08 ASSESSMENT — PAIN DESCRIPTION - LOCATION
LOCATION: HEAD;FACE
LOCATION: HEAD;FACE

## 2024-08-08 ASSESSMENT — PAIN SCALES - GENERAL
PAINLEVEL_OUTOF10: 0
PAINLEVEL_OUTOF10: 3
PAINLEVEL_OUTOF10: 4

## 2024-08-08 ASSESSMENT — PAIN DESCRIPTION - DESCRIPTORS
DESCRIPTORS: ACHING;DISCOMFORT;THROBBING
DESCRIPTORS: ACHING;DISCOMFORT;DULL

## 2024-08-08 NOTE — PLAN OF CARE
Problem: Safety - Adult  Goal: Free from fall injury  8/8/2024 0726 by Jaci Brown RN  Outcome: Progressing  8/7/2024 2319 by Gracie Matthews RN  Outcome: Progressing  8/7/2024 1739 by Jaci Brown RN  Outcome: Progressing     Problem: Chronic Conditions and Co-morbidities  Goal: Patient's chronic conditions and co-morbidity symptoms are monitored and maintained or improved  8/8/2024 0726 by Jaci Brown RN  Outcome: Progressing  8/7/2024 2319 by Gracie Matthews RN  Outcome: Progressing  8/7/2024 1739 by Jaci Brown RN  Outcome: Progressing     Problem: Discharge Planning  Goal: Discharge to home or other facility with appropriate resources  8/8/2024 0726 by Jaci Brown RN  Outcome: Progressing  8/7/2024 2319 by Gracie Matthews RN  Outcome: Progressing  8/7/2024 1739 by Jaci Brown RN  Outcome: Progressing     Problem: Skin/Tissue Integrity  Goal: Absence of new skin breakdown  Description: 1.  Monitor for areas of redness and/or skin breakdown  2.  Assess vascular access sites hourly  3.  Every 4-6 hours minimum:  Change oxygen saturation probe site  4.  Every 4-6 hours:  If on nasal continuous positive airway pressure, respiratory therapy assess nares and determine need for appliance change or resting period.  8/8/2024 0726 by Jaci Brown RN  Outcome: Progressing  8/7/2024 2319 by Gracie Matthews RN  Outcome: Progressing  8/7/2024 1739 by Jaci Brown RN  Outcome: Progressing     Problem: ABCDS Injury Assessment  Goal: Absence of physical injury  8/8/2024 0726 by Jaci Brown RN  Outcome: Progressing  8/7/2024 2319 by Gracie Matthews RN  Outcome: Progressing  8/7/2024 1739 by Jaci Brown RN  Outcome: Progressing     Problem: Pain  Goal: Verbalizes/displays adequate comfort level or baseline comfort level  8/8/2024 0726 by Jaci Brown RN  Outcome: Progressing  8/7/2024 2319 by Gracie Matthews RN  Outcome: Progressing

## 2024-08-08 NOTE — PLAN OF CARE
Problem: Safety - Adult  Goal: Free from fall injury  8/7/2024 2319 by Gracie Matthews RN  Outcome: Progressing  8/7/2024 1739 by Jaci Brown RN  Outcome: Progressing     Problem: Chronic Conditions and Co-morbidities  Goal: Patient's chronic conditions and co-morbidity symptoms are monitored and maintained or improved  8/7/2024 2319 by Gracie Matthews RN  Outcome: Progressing  8/7/2024 1739 by Jaci Brown RN  Outcome: Progressing     Problem: Discharge Planning  Goal: Discharge to home or other facility with appropriate resources  8/7/2024 2319 by Gracie Matthews RN  Outcome: Progressing  8/7/2024 1739 by Jaci Brown RN  Outcome: Progressing     Problem: Skin/Tissue Integrity  Goal: Absence of new skin breakdown  Description: 1.  Monitor for areas of redness and/or skin breakdown  2.  Assess vascular access sites hourly  3.  Every 4-6 hours minimum:  Change oxygen saturation probe site  4.  Every 4-6 hours:  If on nasal continuous positive airway pressure, respiratory therapy assess nares and determine need for appliance change or resting period.  8/7/2024 2319 by Gracie Matthews RN  Outcome: Progressing  8/7/2024 1739 by Jaci Brown RN  Outcome: Progressing     Problem: ABCDS Injury Assessment  Goal: Absence of physical injury  8/7/2024 2319 by Gracie Matthews RN  Outcome: Progressing  8/7/2024 1739 by Jaci Brown RN  Outcome: Progressing     Problem: Pain  Goal: Verbalizes/displays adequate comfort level or baseline comfort level  Outcome: Progressing

## 2024-08-08 NOTE — PROGRESS NOTES
Occupational Therapy  OCCUPATIONAL THERAPY INITIAL EVALUATION    [facility]  [address], [place]      Date:2024                                                Patient Name: Mercy Simmons  MRN: [id number]  : 1940  Room: [address] [place] [address]    Evaluating OT: Zak Miller OTR/L #[id number]     Referring Provider:     Robert Stephen DO     Specific Provider Orders/Date: OT eval and treat 24    Diagnosis: Trauma [T14.90XA]  Intracranial hemorrhage (HCC) [I62.9]  Meningioma (HCC) [D32.9]  Traumatic hematoma of forehead, initial encounter [S00.83XA]  Fall, initial encounter [W19.XXXA]   Pt admitted to hospital following fall; [facility]     Pertinent Medical History:  has a past medical history of ACL (anterior cruciate ligament) rupture, Acute ischemic colitis (HCC), Anxiety, Arthritis, CAD (coronary artery disease), Cardiomyopathy (HCC), Carotid artery stenosis, Chest pain, CHF (congestive heart failure) (HCC), Diabetes mellitus (HCC), Diabetes mellitus type 2, controlled (HCC), Diabetic retinopathy (HCC), Generalized osteoarthritis of multiple sites, Heart murmur, History of tobacco abuse, Hypercalcemia, Hypercholesterolemia, Hyperlipidemia, Hypertension, LV dysfunction, Mitral regurgitation, Obesity, Preoperative clearance, Rectal bleeding, Renal cyst, left, Tear meniscus knee, Tear meniscus knee, and Thyroid nodule.       Precautions:  Fall Risk, bed/chair alarm     Assessment of current deficits    [x] Functional mobility  [x]ADLs  [x] Strength               []Cognition    [x] Functional transfers   [x] IADLs         [x] Safety Awareness   [x]Endurance    [] Fine Coordination              [x] Balance      [] Vision/perception   []Sensation     []Gross Motor Coordination  [] ROM  [] Delirium                   [] Motor Control     OT PLAN OF CARE   OT POC based on physician orders, patient diagnosis and results of clinical

## 2024-08-08 NOTE — CARE COORDINATION
8/8/24  Spoke with patient regarding transition of care.  Patient admitted after a fall at home with subarachnoid hemorrhage.  Neurosurgery consulted with no planned intervention.  CT shows a stable bleed.  Patient is on Keppra for 7 days and planned for a Zio patch at discharge.  Patient states she lives alone and was independent with all ADL's and driving prior to admit.  Patient did not use any DME. PCP is Dr. Cardona and pharmacy choice is [facility] in [place].  Therapy evalauted patient with AM-PAC of 15/24 for PT and 16/24 for OT.  Therapy is recommending a MATHEUS stay and patient agreeable.  Patient was choiced with referrals made to 1. [facility] and 2. [facility] both of which are pending. /DORIS to follow for discharge planning once destination is confirmed.    Electronically signed by BHUPINDER Huerta on 8/8/2024 at 2:57 PM     Case Management Assessment  Initial Evaluation    Date/Time of Evaluation: 8/8/2024 2:58 PM  Assessment Completed by: BHUPINDER Huerta    If patient is discharged prior to next notation, then this note serves as note for discharge by case management.    Patient Name: Mercy Simmons                   YOB: 1940  Diagnosis: Trauma [T14.90XA]  Intracranial hemorrhage (HCC) [I62.9]  Meningioma (HCC) [D32.9]  Traumatic hematoma of forehead, initial encounter [S00.83XA]  Fall, initial encounter [W19.XXXA]                   Date / Time: 8/6/2024  2:46 PM    Patient Admission Status: Inpatient   Readmission Risk (Low < 19, Mod (19-27), High > 27): Readmission Risk Score: 16    Current PCP: Clint Waters MD  PCP verified by CM? Yes    Chart Reviewed: Yes      History Provided by: Patient  Patient Orientation: Alert and Oriented, Person, Place, Situation    Patient Cognition: Alert    Hospitalization in the last 30 days (Readmission):  No    If yes, Readmission Assessment in  Navigator will be completed.    Advance Directives:      Code Status:  [S00.83XA]  Fall, initial encounter [W19.XXXA]    IF APPLICABLE: The Patient and/or patient representative Mercy and her family were provided with a choice of provider and agrees with the discharge plan. Freedom of choice list with basic dialogue that supports the patient's individualized plan of care/goals and shares the quality data associated with the providers was provided to:     Patient Representative Name:       The Patient and/or Patient Representative Agree with the Discharge Plan?  yes    BHUPINDER Huerta  Case Management Department  Ph: [phone] Fax:

## 2024-08-08 NOTE — PLAN OF CARE
Problem: Safety - Adult  Goal: Free from fall injury  8/8/2024 1947 by Gracie Matthews RN  Outcome: Progressing  8/8/2024 0726 by Jaci Brown RN  Outcome: Progressing     Problem: Chronic Conditions and Co-morbidities  Goal: Patient's chronic conditions and co-morbidity symptoms are monitored and maintained or improved  8/8/2024 1947 by Gracie Matthews RN  Outcome: Progressing  8/8/2024 0726 by Jaci Brown RN  Outcome: Progressing     Problem: Discharge Planning  Goal: Discharge to home or other facility with appropriate resources  8/8/2024 1947 by Gracie Matthews RN  Outcome: Progressing  8/8/2024 0726 by Jaci Brown RN  Outcome: Progressing     Problem: Skin/Tissue Integrity  Goal: Absence of new skin breakdown  Description: 1.  Monitor for areas of redness and/or skin breakdown  2.  Assess vascular access sites hourly  3.  Every 4-6 hours minimum:  Change oxygen saturation probe site  4.  Every 4-6 hours:  If on nasal continuous positive airway pressure, respiratory therapy assess nares and determine need for appliance change or resting period.  8/8/2024 1947 by Gracie Matthews RN  Outcome: Progressing  8/8/2024 0726 by Jaci Brown RN  Outcome: Progressing     Problem: ABCDS Injury Assessment  Goal: Absence of physical injury  8/8/2024 1947 by Gracie Matthews RN  Outcome: Progressing  8/8/2024 0726 by Jaci Brown RN  Outcome: Progressing     Problem: Pain  Goal: Verbalizes/displays adequate comfort level or baseline comfort level  8/8/2024 1947 by Gracie Matthews RN  Outcome: Progressing  8/8/2024 0726 by Jaci Brown RN  Outcome: Progressing     Problem: Nutrition Deficit:  Goal: Optimize nutritional status  Outcome: Progressing

## 2024-08-08 NOTE — PROGRESS NOTES
Comprehensive Nutrition Assessment    Type and Reason for Visit:  Initial, Positive Nutrition Screen    Nutrition Recommendations/Plan:   Recommend and start Glucerna supplement BID and Magic cup supplement daily to help meet nutritional needs.           Malnutrition Assessment:  Malnutrition Status:  At risk for malnutrition (Comment) (08/08/24 1135)    Context:  Acute Illness     Findings of the 6 clinical characteristics of malnutrition:  Energy Intake:  Mild decrease in energy intake (Comment) (since admission)  Weight Loss:  Unable to assess (d/t lack of recent actual weight history)     Body Fat Loss:  Unable to assess     Muscle Mass Loss:  Unable to assess    Fluid Accumulation:  No significant fluid accumulation     Strength:  Not Performed    Nutrition Assessment:    Patient adm s/p fall ; noted traumatic hematoma of forehead ; noted intracranial hemorrhage and meningioma ; hx of DM/CAD/CHF/CKD ; noted decreased po intake/appetite PTA ; will provide recommendations    Nutrition Related Findings:    I&Os WNL, no edema, active BS, redness to coccyx ; Wound Type: None       Current Nutrition Intake & Therapies:    Average Meal Intake: 51-75%     ADULT DIET; Regular  ADULT ORAL NUTRITION SUPPLEMENT; Breakfast, Lunch, HS Snack; Standard High Calorie/High Protein Oral Supplement    Anthropometric Measures:  Height: 154.9 cm (5' 1\")  Ideal Body Weight (IBW): 105 lbs (48 kg)       Current Body Weight: 73.9 kg (163 lb) (8/7, actual), 155.2 % IBW.    Current BMI (kg/m2): 30.8  Usual Body Weight:  (UTO ; no recent actual weight history ; EMR shows past weight of 176# no method on 5/22/24)                       BMI Categories: Obese Class 1 (BMI 30.0-34.9)    Estimated Daily Nutrient Needs:  Energy Requirements Based On: Formula  Weight Used for Energy Requirements: Current  Energy (kcal/day): 5068-2709 (REE 1133 x 1.2 SF)  Weight Used for Protein Requirements: Ideal  Protein (g/day): 60-75 (1.3-1.5g/kg  IBW)  Method Used for Fluid Requirements: 1 ml/kcal  Fluid (ml/day): 6038-0873    Nutrition Diagnosis:   Inadequate oral intake related to acute injury/trauma (s/p fall) as evidenced by poor intake prior to admission, intake 51-75%    Nutrition Interventions:   Food and/or Nutrient Delivery: Continue Current Diet, Modify Oral Nutrition Supplement  Nutrition Education/Counseling: Education not indicated  Coordination of Nutrition Care: Continue to monitor while inpatient       Goals:  Previous Goal Met: Progressing toward Goal(s)  Goals: Meet at least 75% of estimated needs, by next RD assessment       Nutrition Monitoring and Evaluation:   Behavioral-Environmental Outcomes: None Identified  Food/Nutrient Intake Outcomes: Food and Nutrient Intake, Supplement Intake  Physical Signs/Symptoms Outcomes: Biochemical Data, Chewing or Swallowing, GI Status, Hemodynamic Status, Meal Time Behavior, Fluid Status or Edema, Weight, Skin, Nutrition Focused Physical Findings    Discharge Planning:    Too soon to determine     Newton Wallace RD, LD  Contact: [phone]

## 2024-08-08 NOTE — PROGRESS NOTES
Physical Therapy  Physical Therapy Initial Assessment     Name: Mercy Simmons  : 1940  MRN: [id number]      Date of Service: 2024    Evaluating PT:  Toño Wood, PT, DPT [id number]    Room #:  6501/6501-A  Diagnosis:  Trauma [T14.90XA]  Intracranial hemorrhage (HCC) [I62.9]  Meningioma (HCC) [D32.9]  Traumatic hematoma of forehead, initial encounter [S00.83XA]  Fall, initial encounter [W19.XXXA]  PMHx/PSHx:    Past Medical History:   Diagnosis Date    ACL (anterior cruciate ligament) rupture     Acute ischemic colitis (HCC) 2012    Anxiety     Arthritis     CAD (coronary artery disease)     Cardiomyopathy (HCC)     Carotid artery stenosis 2012     Carotid ultrasound showed 50-69% stenosis of the middle segment of the left internal carotid artery.    Chest pain 2015    CHF (congestive heart failure) (HCC)     Diabetes mellitus (HCC)     no meds    Diabetes mellitus type 2, controlled (HCC) 2013    Diabetic retinopathy (HCC)     Generalized osteoarthritis of multiple sites     Heart murmur     History of tobacco abuse     Hypercalcemia     Hypercholesterolemia     Hyperlipidemia     Hypertension     LV dysfunction     Severe.    Mitral regurgitation     mild    Obesity     Preoperative clearance 2-13-15    Cardiac clearance for OR 3-18-15 from Dr. Estes in EPIC letters    Rectal bleeding 2012 Exploratory laparotomy, lysis of excessive adhesions, left hemicolectomy and intraoperative colonoscopy:  Findings consistnet with ischemic colitis, segmental, 50 cm from the anal verge. Pathology negative for malignancy.    Renal cyst, left     On ultrasound 2007.  remains current    Tear meniscus knee 3/17/2011    Small medial meniscal tear and medial collateral ligament tear of right knee noted on MRI.  S/P right knee arthroscopic surgery in 2011.    Tear meniscus knee     Thyroid nodule      Procedure/Surgery:  None  Precautions:  Falls, scattered SAH, chair

## 2024-08-08 NOTE — PROGRESS NOTES
Neurosurg progress note  VITALS:  BP (!) 136/55   Pulse 97   Temp 97.7 °F (36.5 °C) (Temporal)   Resp 19   Ht 1.549 m (5' 1\")   Wt 74.1 kg (163 lb 5.8 oz)   SpO2 98%   BMI 30.87 kg/m²   24HR INTAKE/OUTPUT:    Intake/Output Summary (Last 24 hours) at 8/8/2024 1420  Last data filed at 8/8/2024 1025  Gross per 24 hour   Intake 300 ml   Output 650 ml   Net -350 ml     CTA HEAD W CONTRAST    Result Date: 8/7/2024  EXAMINATION: CTA OF THE HEAD WITH CONTRAST 8/7/2024 1:03 pm: TECHNIQUE: CTA of the head/brain was performed with the administration of intravenous contrast. Multiplanar reformatted images are provided for review.  MIP images are provided for review. Automated exposure control, iterative reconstruction, and/or weight based adjustment of the mA/kV was utilized to reduce the radiation dose to as low as reasonably achievable. COMPARISON: Correlation made with CT head from yesterday. HISTORY: ORDERING SYSTEM PROVIDED HISTORY: trauma TECHNOLOGIST PROVIDED HISTORY: Reason for exam:->trauma Has a \"code stroke\" or \"stroke alert\" been called?-> What reading provider will be dictating this exam?->CRC FINDINGS: Redemonstration of multiple foci of subarachnoid hemorrhage notable in sulci of the frontal lobes, temporal lobes, left parietal lobe and along inferior margin of the tentorium along superior aspect of the cerebellum.  No evidence of intracranial edema.  No basilar cisterns appear patent.  No hydrocephalus. Anterior cerebral arteries, middle cerebral arteries, and posterior cerebral arteries are patent.  No stenosis involving the basilar artery.  Normal vertebrobasilar junction.  No evidence of dural venous sinus thrombosis.  No intracranial aneurysm.     1. No evidence of intracranial aneurysm. 2. Stable areas of subarachnoid hemorrhage in sulci of both hemispheres as well as along inferior margin of the midline falx along superior aspect of the cerebellum. 3. No intracranial arterial stenosis. 4.

## 2024-08-08 NOTE — PROGRESS NOTES
[facility]uma [facility].    Daily Progress Note 8/8/2024    Date of admission:  8/6/2024    CC: Fall    Subjective:  Doing well overall.  GCS 15.  Some headache that is resolved with Fioricet.  No other complaints.    Objective:  /71   Pulse 98   Temp 98.4 °F (36.9 °C) (Temporal)   Resp 17   Ht 1.549 m (5' 1\")   Wt 74.1 kg (163 lb 5.8 oz)   SpO2 94%   BMI 30.87 kg/m²     GENERAL:  Laying in bed, awake, alert, cooperative, no apparent distress    NEUROLOGIC: GCS 15    HEAD: Normocephalic.  Bilateral periorbital ecchymosis and swelling.  EYES: No sclera icterus, pupils equal  LUNGS:  No increased work of breathing.  room air.   CARDIOVASCULAR: RR  ABDOMEN:  Soft, non-tender, non-distended  EXTREMITIES: No edema or swelling  SKIN: Warm and dry    Assessment/Plan:  83 y.o. female status post syncopal fall with scattered subarachnoid hemorrhage    Principal Problem:    Trauma  Active Problems:    Traumatic subarachnoid hemorrhage with unknown loss of consciousness status (HCC)    Traumatic cephalohematoma    Fall    Traumatic hematoma of forehead  Resolved Problems:    * No resolved hospital problems. *      Neuro: Scattered subarachnoid hemorrhage: Keppra x 7 days.  BP control with goal less than 140.  Neurosurgery consulted.  CT scan repeat with stable bleed.  Hold aspirin.  CTA head negative  History of insomnia, restart home meds.  CV: History of hypertension/hyperlipidemia: Restart home meds  Syncopal workup: Orthostatic, EKG, TSH, Trop -all normal, waiting for orthostatics.  Pulm: tolerating room air    GI: tolerating general diet   Renal: Acute renal insufficiency (resolved): DC IVF.  Monitor creatinine.  Monitor urine output.  ID: afebrile, no acute issues     Endocrine: History of hypothyroidism: Restart levothyroxine.  MSK: no acute issues.   Heme: no acute issues       Bowel regime: GlycoLax  Pain control/Sedation: As above  DVT prophylaxis: SCDs  GI: Regular diet  Glucose protocol: [facility]  daily  Mouth/Eye care: per patient.   Mendoza: none     Code status:    Full Code     Patient/Family update:  As available      Disposition: Pending PT OT    Robert Stephen DO    Attending Attestation     I have seen and examined this patient.  I have personally reviewed and interpreted all relevant labs and imaging.  I agree with the resident documentation.    BP (!) 136/55   Pulse 97   Temp 97.7 °F (36.5 °C) (Temporal)   Resp 19   Ht 1.549 m (5' 1\")   Wt 74.1 kg (163 lb 5.8 oz)   SpO2 98%   BMI 30.87 kg/m²     Injuries/Active problems   ++Traumatic SAH - TBI care   -No evidence of cerebral aneurysm   ++Possible syncope- Orthostatic, EKG, TSH, Trop -all normal, waiting for orthostatics.     Chronic Problems   ++HTN/HLD/Hypothyroidism- home meds resumed     Nutrition- Regular     Managing IV/Enteral/PO Pain medications     Consults and recommendations   NSGY: PT OT to see and evaluate advance diet and activity as tolerated no neurosurgical intervention needed at this time continue current care     Bowel regimen: Glycolax   DVT Ppx: SCDs  GI Ppx: Not indicated     CBC:   Lab Results   Component Value Date/Time    WBC 5.5 08/08/2024 05:14 AM    RBC 2.56 08/08/2024 05:14 AM    HGB 7.2 08/08/2024 05:14 AM    HCT 24.0 08/08/2024 05:14 AM    MCV 93.8 08/08/2024 05:14 AM    MCH 28.1 08/08/2024 05:14 AM    MCHC 30.0 08/08/2024 05:14 AM    RDW 17.5 08/08/2024 05:14 AM     08/08/2024 05:14 AM    MPV 11.0 08/08/2024 05:14 AM     CMP:    Lab Results   Component Value Date/Time     08/08/2024 05:14 AM    K 3.9 08/08/2024 05:14 AM    K 3.2 01/07/2022 11:24 AM     08/08/2024 05:14 AM    CO2 18 08/08/2024 05:14 AM    BUN 19 08/08/2024 05:14 AM    CREATININE 1.1 08/08/2024 05:14 AM    GFRAA >60 01/07/2022 11:24 AM    LABGLOM 51 08/08/2024 05:14 AM    GLUCOSE 120 08/08/2024 05:14 AM    GLUCOSE 133 05/02/2012 04:10 PM    CALCIUM 8.9 08/08/2024 05:14 AM    BILITOT 0.3 08/06/2024 03:14 PM    ALKPHOS 67

## 2024-08-09 LAB
ANION GAP SERPL CALCULATED.3IONS-SCNC: 13 MMOL/L (ref 7–16)
BASOPHILS # BLD: 0.01 K/UL (ref 0–0.2)
BASOPHILS NFR BLD: 0 % (ref 0–2)
BUN SERPL-MCNC: 13 MG/DL (ref 6–23)
CALCIUM SERPL-MCNC: 9.1 MG/DL (ref 8.6–10.2)
CHLORIDE SERPL-SCNC: 110 MMOL/L (ref 98–107)
CO2 SERPL-SCNC: 17 MMOL/L (ref 22–29)
CREAT SERPL-MCNC: 0.8 MG/DL (ref 0.5–1)
EOSINOPHIL # BLD: 0 K/UL (ref 0.05–0.5)
EOSINOPHILS RELATIVE PERCENT: 0 % (ref 0–6)
ERYTHROCYTE [DISTWIDTH] IN BLOOD BY AUTOMATED COUNT: 17.4 % (ref 11.5–15)
GFR, ESTIMATED: 75 ML/MIN/1.73M2
GLUCOSE BLD-MCNC: 120 MG/DL (ref 74–99)
GLUCOSE BLD-MCNC: 130 MG/DL (ref 74–99)
GLUCOSE BLD-MCNC: 134 MG/DL (ref 74–99)
GLUCOSE BLD-MCNC: 173 MG/DL (ref 74–99)
GLUCOSE SERPL-MCNC: 114 MG/DL (ref 74–99)
HCT VFR BLD AUTO: 22.2 % (ref 34–48)
HGB BLD-MCNC: 7.1 G/DL (ref 11.5–15.5)
IMM GRANULOCYTES # BLD AUTO: 0.09 K/UL (ref 0–0.58)
IMM GRANULOCYTES NFR BLD: 2 % (ref 0–5)
LYMPHOCYTES NFR BLD: 2.62 K/UL (ref 1.5–4)
LYMPHOCYTES RELATIVE PERCENT: 43 % (ref 20–42)
MCH RBC QN AUTO: 30 PG (ref 26–35)
MCHC RBC AUTO-ENTMCNC: 32 G/DL (ref 32–34.5)
MCV RBC AUTO: 93.7 FL (ref 80–99.9)
MONOCYTES NFR BLD: 0.77 K/UL (ref 0.1–0.95)
MONOCYTES NFR BLD: 13 % (ref 2–12)
NEUTROPHILS NFR BLD: 42 % (ref 43–80)
NEUTS SEG NFR BLD: 2.54 K/UL (ref 1.8–7.3)
PLATELET # BLD AUTO: 186 K/UL (ref 130–450)
PMV BLD AUTO: 10.8 FL (ref 7–12)
POTASSIUM SERPL-SCNC: 3.7 MMOL/L (ref 3.5–5)
RBC # BLD AUTO: 2.37 M/UL (ref 3.5–5.5)
SODIUM SERPL-SCNC: 140 MMOL/L (ref 132–146)
WBC OTHER # BLD: 6 K/UL (ref 4.5–11.5)

## 2024-08-09 PROCEDURE — 2140000000 HC CCU INTERMEDIATE R&B

## 2024-08-09 PROCEDURE — 6370000000 HC RX 637 (ALT 250 FOR IP)

## 2024-08-09 PROCEDURE — 80048 BASIC METABOLIC PNL TOTAL CA: CPT

## 2024-08-09 PROCEDURE — 82962 GLUCOSE BLOOD TEST: CPT

## 2024-08-09 PROCEDURE — 36415 COLL VENOUS BLD VENIPUNCTURE: CPT

## 2024-08-09 PROCEDURE — 99238 HOSP IP/OBS DSCHRG MGMT 30/<: CPT | Performed by: STUDENT IN AN ORGANIZED HEALTH CARE EDUCATION/TRAINING PROGRAM

## 2024-08-09 PROCEDURE — 6360000002 HC RX W HCPCS

## 2024-08-09 PROCEDURE — 2580000003 HC RX 258

## 2024-08-09 PROCEDURE — 85025 COMPLETE CBC W/AUTO DIFF WBC: CPT

## 2024-08-09 RX ORDER — BUTALBITAL, ACETAMINOPHEN AND CAFFEINE 50; 325; 40 MG/1; MG/1; MG/1
1 TABLET ORAL EVERY 6 HOURS PRN
Qty: 40 TABLET | Refills: 0 | Status: SHIPPED | DISCHARGE
Start: 2024-08-09 | End: 2024-08-19

## 2024-08-09 RX ORDER — SENNA AND DOCUSATE SODIUM 50; 8.6 MG/1; MG/1
1 TABLET, FILM COATED ORAL 2 TIMES DAILY
DISCHARGE
Start: 2024-08-09

## 2024-08-09 RX ORDER — LEVETIRACETAM 500 MG/1
500 TABLET ORAL 2 TIMES DAILY
Qty: 9 TABLET | Refills: 0 | DISCHARGE
Start: 2024-08-09 | End: 2024-08-14

## 2024-08-09 RX ORDER — HEPARIN SODIUM 5000 [USP'U]/ML
5000 INJECTION, SOLUTION INTRAVENOUS; SUBCUTANEOUS EVERY 8 HOURS SCHEDULED
DISCHARGE
Start: 2024-08-09

## 2024-08-09 RX ORDER — HEPARIN SODIUM 5000 [USP'U]/ML
5000 INJECTION, SOLUTION INTRAVENOUS; SUBCUTANEOUS EVERY 8 HOURS SCHEDULED
Status: DISCONTINUED | OUTPATIENT
Start: 2024-08-09 | End: 2024-08-10 | Stop reason: HOSPADM

## 2024-08-09 RX ORDER — MINERAL OIL AND WHITE PETROLATUM 150; 830 MG/G; MG/G
OINTMENT OPHTHALMIC 4 TIMES DAILY
Refills: 0 | DISCHARGE
Start: 2024-08-09

## 2024-08-09 RX ADMIN — MINERAL OIL, WHITE PETROLATUM: .03; .94 OINTMENT OPHTHALMIC at 22:08

## 2024-08-09 RX ADMIN — CARVEDILOL 25 MG: 6.25 TABLET, FILM COATED ORAL at 17:19

## 2024-08-09 RX ADMIN — ISOSORBIDE MONONITRATE 30 MG: 30 TABLET, EXTENDED RELEASE ORAL at 07:59

## 2024-08-09 RX ADMIN — LABETALOL HYDROCHLORIDE 10 MG: 5 INJECTION INTRAVENOUS at 07:58

## 2024-08-09 RX ADMIN — LEVOTHYROXINE SODIUM 25 MCG: 0.03 TABLET ORAL at 06:08

## 2024-08-09 RX ADMIN — LEVETIRACETAM 500 MG: 500 TABLET, FILM COATED ORAL at 07:58

## 2024-08-09 RX ADMIN — SODIUM CHLORIDE, PRESERVATIVE FREE 10 ML: 5 INJECTION INTRAVENOUS at 21:59

## 2024-08-09 RX ADMIN — HEPARIN SODIUM 5000 UNITS: 5000 INJECTION INTRAVENOUS; SUBCUTANEOUS at 21:55

## 2024-08-09 RX ADMIN — LEVETIRACETAM 500 MG: 500 TABLET, FILM COATED ORAL at 21:55

## 2024-08-09 RX ADMIN — ACETAMINOPHEN 650 MG: 325 TABLET ORAL at 07:58

## 2024-08-09 RX ADMIN — ACETAMINOPHEN 650 MG: 325 TABLET ORAL at 03:20

## 2024-08-09 RX ADMIN — LABETALOL HYDROCHLORIDE 10 MG: 5 INJECTION INTRAVENOUS at 03:20

## 2024-08-09 RX ADMIN — ACETAMINOPHEN 650 MG: 325 TABLET ORAL at 21:53

## 2024-08-09 RX ADMIN — ROSUVASTATIN 5 MG: 10 TABLET, FILM COATED ORAL at 07:59

## 2024-08-09 RX ADMIN — MINERAL OIL, WHITE PETROLATUM: .03; .94 OINTMENT OPHTHALMIC at 11:47

## 2024-08-09 RX ADMIN — MINERAL OIL, WHITE PETROLATUM: .03; .94 OINTMENT OPHTHALMIC at 08:00

## 2024-08-09 RX ADMIN — SODIUM CHLORIDE, PRESERVATIVE FREE 10 ML: 5 INJECTION INTRAVENOUS at 07:59

## 2024-08-09 RX ADMIN — CARVEDILOL 25 MG: 6.25 TABLET, FILM COATED ORAL at 07:59

## 2024-08-09 RX ADMIN — LABETALOL HYDROCHLORIDE 10 MG: 5 INJECTION INTRAVENOUS at 11:47

## 2024-08-09 RX ADMIN — ACETAMINOPHEN 650 MG: 325 TABLET ORAL at 14:28

## 2024-08-09 RX ADMIN — HEPARIN SODIUM 5000 UNITS: 5000 INJECTION INTRAVENOUS; SUBCUTANEOUS at 14:27

## 2024-08-09 RX ADMIN — LABETALOL HYDROCHLORIDE 10 MG: 5 INJECTION INTRAVENOUS at 21:58

## 2024-08-09 RX ADMIN — MINERAL OIL, WHITE PETROLATUM: .03; .94 OINTMENT OPHTHALMIC at 17:19

## 2024-08-09 ASSESSMENT — PATIENT HEALTH QUESTIONNAIRE - PHQ9
SUM OF ALL RESPONSES TO PHQ QUESTIONS 1-9: 4
SUM OF ALL RESPONSES TO PHQ QUESTIONS 1-9: 4
1. LITTLE INTEREST OR PLEASURE IN DOING THINGS: MORE THAN HALF THE DAYS
SUM OF ALL RESPONSES TO PHQ QUESTIONS 1-9: 4
SUM OF ALL RESPONSES TO PHQ QUESTIONS 1-9: 4
5. POOR APPETITE OR OVEREATING: MORE THAN HALF THE DAYS

## 2024-08-09 ASSESSMENT — PAIN - FUNCTIONAL ASSESSMENT: PAIN_FUNCTIONAL_ASSESSMENT: ACTIVITIES ARE NOT PREVENTED

## 2024-08-09 ASSESSMENT — PAIN DESCRIPTION - DESCRIPTORS
DESCRIPTORS: ACHING;DISCOMFORT;SORE
DESCRIPTORS: ACHING;DISCOMFORT;THROBBING

## 2024-08-09 ASSESSMENT — PAIN SCALES - GENERAL
PAINLEVEL_OUTOF10: 4
PAINLEVEL_OUTOF10: 0
PAINLEVEL_OUTOF10: 10
PAINLEVEL_OUTOF10: 3

## 2024-08-09 ASSESSMENT — PAIN DESCRIPTION - LOCATION
LOCATION: HEAD
LOCATION: SHOULDER;NECK

## 2024-08-09 NOTE — DISCHARGE INSTR - COC
Continuity of Care Form    Patient Name: Mercy Simmons   :  1940  MRN:  [id number]    Admit date:  2024  Discharge date:  8/10/24    Code Status Order: Full Code   Advance Directives:     Admitting Physician:  Sandoval Kaufman MD  PCP: Clint Waters MD    Discharging Nurse: A.R  Discharging Hospital Unit/Room#: 6501/6501-A  Discharging Unit Phone Number: [phone]    Emergency Contact:   Extended Emergency Contact Information  Primary Emergency Contact: Gonzalo Rodriguez  Mobile Phone: [phone]  Relation: Brother/Sister  Secondary Emergency Contact: Kenneth Simmons  Address: [address] [place] [address]  Home Phone: [phone]  Relation: Child    Past Surgical History:  Past Surgical History:   Procedure Laterality Date    APPENDECTOMY      CARDIOVASCULAR STRESS TEST  2012  Lexiscan nuclear stress test done at [facility].    No evidence of scars or stress-induced ischemia with an EF of 71%.    CATARACT REMOVAL WITH IMPLANT  2009 Right.    2012 Left.     SECTION      x 2.    COLONOSCOPY  2013    CORONARY ANGIOPLASTY WITH STENT PLACEMENT      DIAGNOSTIC CARDIAC CATH LAB PROCEDURE  2007    Cath/angioplasty.  Left main, no significant disease. LAD 80-90% proximal stenosis. LCX large codominant vessel with no significant disease.  RCA moderate codominant vessel with  no significant disesae.  MIldly dilated ventricle with severe generalized hypokinesis with an estimated EF of 20-35% with trace mitral regurg.  Markedly elevated left ventricular end-diastolic pressure.     DIAGNOSTIC CARDIAC CATH LAB PROCEDURE  2007 continued    Systemic hypertension.  Successful balloon angioplsaty with the deployment of drug-eluting coronary stent to the proximal LAD with very good results.  Closure of the right femoral artery access site with AngioSeal device.    ECHO COMPL W DOP COLOR FLOW  2012     Normal LV size, wall thickness, wall

## 2024-08-09 NOTE — PROGRESS NOTES
PCP is Clint Waters MD  Office notified of admission.      Electronically signed by Dunia Welsh RN on 8/9/2024 at 11:29 AM

## 2024-08-09 NOTE — CARE COORDINATION
8/9/24  Transition of care update. Discharge goal is a MATHEUS stay.  Patient has been accepted by [facility].  Patient will not need a pre-cert.  Message to resident to check a discharge. MINO, destination and PASRR complete.  Ambulette started and on the soft chart.  Patient is on Keppra for 7 days and planned for a Zio patch at discharge. KATE/DORIS to follow.    Electronically signed by BHUPINDER Huerta on 8/9/2024 at 10:39 AM

## 2024-08-09 NOTE — PROGRESS NOTES
Trauma  DAILY PROGRESS NOTE  8/9/2024  Subjective:  No acute events overnight. GCS 15.  Orthostats negative.  Is tolerating diet and having bowel movements.    No intake/output data recorded.  I/O last 3 completed shifts:  In: 660 [P.O.:660]  Out: 1400 [Urine:1400]    Objective:  BP (!) 146/63   Pulse 96   Temp 97.8 °F (36.6 °C) (Temporal)   Resp 15   Ht 1.549 m (5' 1\")   Wt 74.1 kg (163 lb 5.8 oz)   SpO2 93%   BMI 30.87 kg/m²     - General: No acute distress. Awake and conversant.  - CV: warm throughout   - Respiratory: Respirations are non-labored   - Abdomen: Soft, non-tender, non-distended.  - Skin: Warm. No rashes or ulcers.  - Extremities: No cyanosis or edema.    Lab Results   Component Value Date    WBC 6.0 08/09/2024    HGB 7.1 (L) 08/09/2024    HCT 22.2 (L) 08/09/2024    MCV 93.7 08/09/2024     08/09/2024     Lab Results   Component Value Date     08/09/2024    K 3.7 08/09/2024     (H) 08/09/2024    CO2 17 (L) 08/09/2024    BUN 13 08/09/2024    CREATININE 0.8 08/09/2024    GLUCOSE 114 (H) 08/09/2024    CALCIUM 9.1 08/09/2024    BILITOT 0.3 08/06/2024    ALKPHOS 67 08/06/2024    AST 20 08/06/2024    ALT 8 08/06/2024    LABGLOM 75 08/09/2024    GFRAA >60 01/07/2022       ASSESSMENT/PLAN:    83 y.o. female status post syncopal ground-level fall with scattered subarachnoid hemorrhage, stable    CTA head negative  Neurosurgery recs: Continue neurochecks and Keppra  Chemical DVT prophylaxis with SUBQ heparin started today  Regular diet  AM-PAC 15 out of 24  PT OT and dispo planning    -Discussed with Dr. Casie Dorsey DO  General Surgery Resident, PGY-1    Electronically signed by Blane Dorsey DO on 8/9/24 at 8:41 AM EDT     Attending Attestation      I have seen and examined this patient.  I have personally reviewed and interpreted all relevant labs and imaging.  I agree with the resident documentation.     BP (!) 146/66   Pulse 96   Temp 97.8 °F (36.6 °C)  (Temporal)   Resp 16   Ht 1.549 m (5' 1\")   Wt 74.1 kg (163 lb 5.8 oz)   SpO2 97%   BMI 30.87 kg/m²     Injuries/Active problems   ++Traumatic SAH - TBI care   -No evidence of cerebral aneurysm   ++Possible syncope- Orthostatic, EKG, TSH, Trop -all normal     Chronic Problems   ++HTN/HLD/Hypothyroidism- home meds resumed      Nutrition- Regular      Managing IV/Enteral/PO Pain medications      Consults and recommendations   NSGY: Will need head CT 2 weeks follow-up if discharged no neurosurgical intervention needed at this time continue current care      Bowel regimen: Glycolax   DVT Ppx: SCDs, Hep   GI Ppx: Not indicated      CBC:   Lab Results   Component Value Date/Time    WBC 6.0 08/09/2024 06:51 AM    RBC 2.37 08/09/2024 06:51 AM    HGB 7.1 08/09/2024 06:51 AM    HCT 22.2 08/09/2024 06:51 AM    MCV 93.7 08/09/2024 06:51 AM    MCH 30.0 08/09/2024 06:51 AM    MCHC 32.0 08/09/2024 06:51 AM    RDW 17.4 08/09/2024 06:51 AM     08/09/2024 06:51 AM    MPV 10.8 08/09/2024 06:51 AM     CMP:    Lab Results   Component Value Date/Time     08/09/2024 06:51 AM    K 3.7 08/09/2024 06:51 AM    K 3.2 01/07/2022 11:24 AM     08/09/2024 06:51 AM    CO2 17 08/09/2024 06:51 AM    BUN 13 08/09/2024 06:51 AM    CREATININE 0.8 08/09/2024 06:51 AM    GFRAA >60 01/07/2022 11:24 AM    LABGLOM 75 08/09/2024 06:51 AM    GLUCOSE 114 08/09/2024 06:51 AM    GLUCOSE 133 05/02/2012 04:10 PM    CALCIUM 9.1 08/09/2024 06:51 AM    BILITOT 0.3 08/06/2024 03:14 PM    ALKPHOS 67 08/06/2024 03:14 PM    AST 20 08/06/2024 03:14 PM    ALT 8 08/06/2024 03:14 PM     Dispo: Discharge      Imaging: No new imaging to review      Rajesh Jason MD   Trauma/Critical care

## 2024-08-09 NOTE — PROGRESS NOTES
Messaged SROC regarding BP 96/59 and due for IV labetolol, with no holding parameters.    Cristal Merlos RN

## 2024-08-09 NOTE — DISCHARGE INSTRUCTIONS
TRAUMA SERVICES DISCHARGE INSTRUCTIONS    Call [phone], option 2, for any questions/concerns and for follow-up appointment in 2 week(s).    Please follow the instructions checked below:    During the course of your workup, we identified an incidental finding of:  right meningioma  Please follow-up with your primary care provider.    ACTIVITY INSTRUCTIONS  Increase activity as tolerated  No heavy lifting or strenuous activity  Take your incentive spirometer home and use 4-6 times/day   [x]  No driving until cleared by trauma, neurosurgery    WOUND/DRESSING INSTRUCTIONS:  You may shower.  No sitting in bath tub, hot tub or swimming until cleared by physician.  Ice to areas of pain for first 24 hours.  Heat to areas of pain after that.  Wash areas of lacerations/abrasions with soap & water.  Rinse well.  Pat dry with clean towel.  Apply thin layer of Bacitracin, Neosporin, or triple antibiotic cream to affected area 2-3 times per day.  Keep wounds clean and dry.    MEDICATION INSTRUCTIONS  Take medication as prescribed.  When taking pain medications, you may experience dizziness or drowsiness.  Do not drink alcohol or drive when taking these medications.  You may experience constipation while taking pain medication.  You may take over the counter stool softeners such as docusate (Colace), sennosides S (Senokot-S), or Miralax.   []  You may take Ibuprofen (over the counter) as directed for mild pain.     --You may take up to 800mg every 8 hours for pain, please take with food or milk.   [x]  You may take acetaminophen (Tylenol) products.  Do NOT take more than 4000mg of Tylenol in 24h.   []  Do not take any other acetaminophen (Tylenol) products if you are taking Percocet or Norco, as these contain Tylenol.   --Do NOT take more than 4000mg of Tylenol in 24h.    OPIOID MEDICATION INSTRUCTIONS  Read the medication guide that is included with your prescription.  Take your medication exactly as prescribed.  Store

## 2024-08-09 NOTE — PROGRESS NOTES
Neurosurg progress note  VITALS:  BP (!) 120/55   Pulse 88   Temp 97.4 °F (36.3 °C) (Temporal)   Resp 16   Ht 1.549 m (5' 1\")   Wt 74.1 kg (163 lb 5.8 oz)   SpO2 94%   BMI 30.87 kg/m²   24HR INTAKE/OUTPUT:    Intake/Output Summary (Last 24 hours) at 8/9/2024 1234  Last data filed at 8/9/2024 0418  Gross per 24 hour   Intake 360 ml   Output 750 ml   Net -390 ml     CTA HEAD W CONTRAST    Result Date: 8/7/2024  EXAMINATION: CTA OF THE HEAD WITH CONTRAST 8/7/2024 1:03 pm: TECHNIQUE: CTA of the head/brain was performed with the administration of intravenous contrast. Multiplanar reformatted images are provided for review.  MIP images are provided for review. Automated exposure control, iterative reconstruction, and/or weight based adjustment of the mA/kV was utilized to reduce the radiation dose to as low as reasonably achievable. COMPARISON: Correlation made with CT head from yesterday. HISTORY: ORDERING SYSTEM PROVIDED HISTORY: trauma TECHNOLOGIST PROVIDED HISTORY: Reason for exam:->trauma Has a \"code stroke\" or \"stroke alert\" been called?-> What reading provider will be dictating this exam?->CRC FINDINGS: Redemonstration of multiple foci of subarachnoid hemorrhage notable in sulci of the frontal lobes, temporal lobes, left parietal lobe and along inferior margin of the tentorium along superior aspect of the cerebellum.  No evidence of intracranial edema.  No basilar cisterns appear patent.  No hydrocephalus. Anterior cerebral arteries, middle cerebral arteries, and posterior cerebral arteries are patent.  No stenosis involving the basilar artery.  Normal vertebrobasilar junction.  No evidence of dural venous sinus thrombosis.  No intracranial aneurysm.     1. No evidence of intracranial aneurysm. 2. Stable areas of subarachnoid hemorrhage in sulci of both hemispheres as well as along inferior margin of the midline falx along superior aspect of the cerebellum. 3. No intracranial arterial stenosis. 4.

## 2024-08-09 NOTE — PROGRESS NOTES
Notified [facility] of pt able to be picked up in the morning by [facility]. Will relay message to oncoming nurse also    Cristal Merlos RN

## 2024-08-09 NOTE — PATIENT CARE CONFERENCE
P Quality Flow/Interdisciplinary Rounds Progress Note        Quality Flow Rounds held on August 9, 2024    Disciplines Attending:  Bedside Nurse, , , and Nursing Unit Leadership    Mercy Simmons was admitted on 8/6/2024  2:46 PM    Anticipated Discharge Date:       Disposition:    Albert Score:  Albert Scale Score: 18    Readmission Risk              Risk of Unplanned Readmission:  11           Discussed patient goal for the day, patient clinical progression, and barriers to discharge.  The following Goal(s) of the Day/Commitment(s) have been identified:  Labs - Report Results      Monica Xavier RN  August 9, 2024

## 2024-08-10 ENCOUNTER — APPOINTMENT (OUTPATIENT)
Dept: GENERAL RADIOLOGY | Age: 84
End: 2024-08-10
Payer: MEDICARE

## 2024-08-10 VITALS
HEIGHT: 61 IN | RESPIRATION RATE: 16 BRPM | SYSTOLIC BLOOD PRESSURE: 161 MMHG | DIASTOLIC BLOOD PRESSURE: 67 MMHG | TEMPERATURE: 97.1 F | WEIGHT: 163.36 LBS | OXYGEN SATURATION: 97 % | HEART RATE: 96 BPM | BODY MASS INDEX: 30.84 KG/M2

## 2024-08-10 LAB
ANION GAP SERPL CALCULATED.3IONS-SCNC: 11 MMOL/L (ref 7–16)
BASOPHILS # BLD: 0.01 K/UL (ref 0–0.2)
BASOPHILS NFR BLD: 0 % (ref 0–2)
BUN SERPL-MCNC: 11 MG/DL (ref 6–23)
CALCIUM SERPL-MCNC: 8.9 MG/DL (ref 8.6–10.2)
CHLORIDE SERPL-SCNC: 107 MMOL/L (ref 98–107)
CO2 SERPL-SCNC: 20 MMOL/L (ref 22–29)
CREAT SERPL-MCNC: 0.8 MG/DL (ref 0.5–1)
EOSINOPHIL # BLD: 0 K/UL (ref 0.05–0.5)
EOSINOPHILS RELATIVE PERCENT: 0 % (ref 0–6)
ERYTHROCYTE [DISTWIDTH] IN BLOOD BY AUTOMATED COUNT: 16.9 % (ref 11.5–15)
GFR, ESTIMATED: 79 ML/MIN/1.73M2
GLUCOSE BLD-MCNC: 147 MG/DL (ref 74–99)
GLUCOSE BLD-MCNC: 149 MG/DL (ref 74–99)
GLUCOSE SERPL-MCNC: 136 MG/DL (ref 74–99)
HCT VFR BLD AUTO: 24.1 % (ref 34–48)
HGB BLD-MCNC: 7.3 G/DL (ref 11.5–15.5)
IMM GRANULOCYTES # BLD AUTO: 0.11 K/UL (ref 0–0.58)
IMM GRANULOCYTES NFR BLD: 2 % (ref 0–5)
LYMPHOCYTES NFR BLD: 2.41 K/UL (ref 1.5–4)
LYMPHOCYTES RELATIVE PERCENT: 43 % (ref 20–42)
MCH RBC QN AUTO: 28.2 PG (ref 26–35)
MCHC RBC AUTO-ENTMCNC: 30.3 G/DL (ref 32–34.5)
MCV RBC AUTO: 93.1 FL (ref 80–99.9)
MONOCYTES NFR BLD: 0.76 K/UL (ref 0.1–0.95)
MONOCYTES NFR BLD: 14 % (ref 2–12)
NEUTROPHILS NFR BLD: 42 % (ref 43–80)
NEUTS SEG NFR BLD: 2.36 K/UL (ref 1.8–7.3)
PLATELET # BLD AUTO: 207 K/UL (ref 130–450)
PMV BLD AUTO: 11 FL (ref 7–12)
POTASSIUM SERPL-SCNC: 4.2 MMOL/L (ref 3.5–5)
RBC # BLD AUTO: 2.59 M/UL (ref 3.5–5.5)
SODIUM SERPL-SCNC: 138 MMOL/L (ref 132–146)
WBC OTHER # BLD: 5.7 K/UL (ref 4.5–11.5)

## 2024-08-10 PROCEDURE — 6360000002 HC RX W HCPCS

## 2024-08-10 PROCEDURE — 73030 X-RAY EXAM OF SHOULDER: CPT

## 2024-08-10 PROCEDURE — 80048 BASIC METABOLIC PNL TOTAL CA: CPT

## 2024-08-10 PROCEDURE — 85025 COMPLETE CBC W/AUTO DIFF WBC: CPT

## 2024-08-10 PROCEDURE — 2580000003 HC RX 258

## 2024-08-10 PROCEDURE — 6370000000 HC RX 637 (ALT 250 FOR IP)

## 2024-08-10 PROCEDURE — 82962 GLUCOSE BLOOD TEST: CPT

## 2024-08-10 PROCEDURE — 36415 COLL VENOUS BLD VENIPUNCTURE: CPT

## 2024-08-10 RX ORDER — OXYCODONE HYDROCHLORIDE 5 MG/1
2.5 TABLET ORAL EVERY 4 HOURS PRN
Status: DISCONTINUED | OUTPATIENT
Start: 2024-08-10 | End: 2024-08-10 | Stop reason: HOSPADM

## 2024-08-10 RX ADMIN — CARVEDILOL 25 MG: 6.25 TABLET, FILM COATED ORAL at 08:28

## 2024-08-10 RX ADMIN — LEVETIRACETAM 500 MG: 500 TABLET, FILM COATED ORAL at 08:29

## 2024-08-10 RX ADMIN — LABETALOL HYDROCHLORIDE 10 MG: 5 INJECTION INTRAVENOUS at 00:17

## 2024-08-10 RX ADMIN — ACETAMINOPHEN 650 MG: 325 TABLET ORAL at 08:28

## 2024-08-10 RX ADMIN — LABETALOL HYDROCHLORIDE 10 MG: 5 INJECTION INTRAVENOUS at 05:30

## 2024-08-10 RX ADMIN — BUTALBITAL, ACETAMINOPHEN AND CAFFEINE 1 TABLET: 325; 50; 40 TABLET ORAL at 05:30

## 2024-08-10 RX ADMIN — SODIUM CHLORIDE, PRESERVATIVE FREE 10 ML: 5 INJECTION INTRAVENOUS at 08:27

## 2024-08-10 RX ADMIN — LABETALOL HYDROCHLORIDE 10 MG: 5 INJECTION INTRAVENOUS at 08:29

## 2024-08-10 RX ADMIN — LABETALOL HYDROCHLORIDE 10 MG: 5 INJECTION INTRAVENOUS at 15:17

## 2024-08-10 RX ADMIN — HEPARIN SODIUM 5000 UNITS: 5000 INJECTION INTRAVENOUS; SUBCUTANEOUS at 14:08

## 2024-08-10 RX ADMIN — CARVEDILOL 25 MG: 6.25 TABLET, FILM COATED ORAL at 15:16

## 2024-08-10 RX ADMIN — LEVOTHYROXINE SODIUM 25 MCG: 0.03 TABLET ORAL at 05:30

## 2024-08-10 RX ADMIN — ACETAMINOPHEN 650 MG: 325 TABLET ORAL at 05:30

## 2024-08-10 RX ADMIN — LABETALOL HYDROCHLORIDE 10 MG: 5 INJECTION INTRAVENOUS at 12:12

## 2024-08-10 RX ADMIN — HEPARIN SODIUM 5000 UNITS: 5000 INJECTION INTRAVENOUS; SUBCUTANEOUS at 05:30

## 2024-08-10 RX ADMIN — ACETAMINOPHEN 650 MG: 325 TABLET ORAL at 15:16

## 2024-08-10 RX ADMIN — ROSUVASTATIN 5 MG: 10 TABLET, FILM COATED ORAL at 08:28

## 2024-08-10 RX ADMIN — MINERAL OIL, WHITE PETROLATUM: .03; .94 OINTMENT OPHTHALMIC at 08:30

## 2024-08-10 RX ADMIN — SENNOSIDES AND DOCUSATE SODIUM 1 TABLET: 50; 8.6 TABLET ORAL at 08:28

## 2024-08-10 RX ADMIN — ISOSORBIDE MONONITRATE 30 MG: 30 TABLET, EXTENDED RELEASE ORAL at 08:28

## 2024-08-10 RX ADMIN — POLYETHYLENE GLYCOL 3350 17 G: 17 POWDER, FOR SOLUTION ORAL at 08:26

## 2024-08-10 RX ADMIN — MINERAL OIL, WHITE PETROLATUM: .03; .94 OINTMENT OPHTHALMIC at 12:13

## 2024-08-10 ASSESSMENT — PAIN DESCRIPTION - ORIENTATION
ORIENTATION: LEFT;POSTERIOR
ORIENTATION: LEFT;ANTERIOR

## 2024-08-10 ASSESSMENT — PAIN DESCRIPTION - DESCRIPTORS
DESCRIPTORS: ACHING;DISCOMFORT;NAGGING
DESCRIPTORS: ACHING;DISCOMFORT;SORE
DESCRIPTORS: ACHING;DISCOMFORT;SORE

## 2024-08-10 ASSESSMENT — PAIN DESCRIPTION - LOCATION
LOCATION: SHOULDER;NECK
LOCATION: HEAD
LOCATION: NECK;SHOULDER

## 2024-08-10 ASSESSMENT — PAIN SCALES - GENERAL
PAINLEVEL_OUTOF10: 5
PAINLEVEL_OUTOF10: 3
PAINLEVEL_OUTOF10: 7

## 2024-08-10 NOTE — PROGRESS NOTES
P Quality Flow/Interdisciplinary Rounds Progress Note        Quality Flow Rounds held on August 10, 2024    Disciplines Attending:  Bedside Nurse, , , and Nursing Unit Leadership    Mercy Simmons was admitted on 8/6/2024  2:46 PM    Anticipated Discharge Date:       Disposition:    Albert Score:  Albert Scale Score: 20    Readmission Risk              Risk of Unplanned Readmission:  13           Discussed patient goal for the day, patient clinical progression, and barriers to discharge.  The following Goal(s) of the Day/Commitment(s) have been identified:  Education/Learning - Alternate Learner      Vee Young RN  August 10, 2024

## 2024-08-10 NOTE — CARE COORDINATION
Discharge order noted. Patient pending an XR of the shoulder and a potential discharge, pending the results. Patient to discharge to [facility], per previous SW the facility is able to transport patient early in the morning or later in the evening; staff will need to call the facility when patient is ready. Ambulette form, MINO and PASRR completed. Patient to receive ZioPatch prior to discharge.    12:44P  Spoke with the nurse, XR negative. Called cole Padron for [facility] regarding transport; she will call transport to get a time and call SW back.    12:45P  Facility transport will  patient at 4p. Nurse informed.    Electronically signed by SANDY Page on 8/10/2024 at 8:25 AM

## 2024-08-10 NOTE — PLAN OF CARE
Problem: Safety - Adult  Goal: Free from fall injury  8/10/2024 0023 by Modesto Mayer RN  Outcome: Progressing     Problem: Chronic Conditions and Co-morbidities  Goal: Patient's chronic conditions and co-morbidity symptoms are monitored and maintained or improved  8/10/2024 0023 by Modesto Mayer RN  Outcome: Progressing     Problem: Discharge Planning  Goal: Discharge to home or other facility with appropriate resources  8/10/2024 0023 by Modesto Mayer, RN  Outcome: Progressing     Problem: Skin/Tissue Integrity  Goal: Absence of new skin breakdown  Description: 1.  Monitor for areas of redness and/or skin breakdown  2.  Assess vascular access sites hourly  3.  Every 4-6 hours minimum:  Change oxygen saturation probe site  4.  Every 4-6 hours:  If on nasal continuous positive airway pressure, respiratory therapy assess nares and determine need for appliance change or resting period.  8/10/2024 0023 by Modesto Mayer, RN  Outcome: Progressing     Problem: ABCDS Injury Assessment  Goal: Absence of physical injury  8/10/2024 0023 by Modesto Mayer, RN  Outcome: Progressing     Problem: Pain  Goal: Verbalizes/displays adequate comfort level or baseline comfort level  8/10/2024 0023 by Modesto Mayer, RN  Outcome: Progressing

## 2024-08-10 NOTE — PLAN OF CARE
Problem: Safety - Adult  Goal: Free from fall injury  8/10/2024 1013 by Dimas Booth RN  Outcome: Progressing  8/10/2024 0023 by Modesto Mayer RN  Outcome: Progressing     Problem: Chronic Conditions and Co-morbidities  Goal: Patient's chronic conditions and co-morbidity symptoms are monitored and maintained or improved  8/10/2024 1013 by Dimas Booth RN  Outcome: Progressing  8/10/2024 0023 by Modesto Mayer RN  Outcome: Progressing     Problem: Discharge Planning  Goal: Discharge to home or other facility with appropriate resources  8/10/2024 1013 by Dimas Booth RN  Outcome: Progressing  8/10/2024 0023 by Modesto Mayer RN  Outcome: Progressing     Problem: Skin/Tissue Integrity  Goal: Absence of new skin breakdown  Description: 1.  Monitor for areas of redness and/or skin breakdown  2.  Assess vascular access sites hourly  3.  Every 4-6 hours minimum:  Change oxygen saturation probe site  4.  Every 4-6 hours:  If on nasal continuous positive airway pressure, respiratory therapy assess nares and determine need for appliance change or resting period.  8/10/2024 1013 by Dimas Booth RN  Outcome: Progressing  8/10/2024 0023 by Modesto Mayer RN  Outcome: Progressing     Problem: ABCDS Injury Assessment  Goal: Absence of physical injury  8/10/2024 1013 by Dimas Booth RN  Outcome: Progressing  8/10/2024 0023 by Modesto Mayer RN  Outcome: Progressing     Problem: Pain  Goal: Verbalizes/displays adequate comfort level or baseline comfort level  8/10/2024 1013 by Dimas Booth RN  Outcome: Progressing  8/10/2024 0023 by Modesto Mayer RN  Outcome: Progressing     Problem: Nutrition Deficit:  Goal: Optimize nutritional status  Outcome: Progressing

## 2024-08-10 NOTE — PROGRESS NOTES
Patient heart monitor is removed as well as her IV; Zio patch was placed on and given instructions to patient and Haleigh POZO at [facility]. Dimas Booth RN

## 2024-08-27 ENCOUNTER — OUTSIDE SERVICES (OUTPATIENT)
Dept: FAMILY MEDICINE CLINIC | Age: 84
End: 2024-08-27
Payer: MEDICARE

## 2024-08-27 DIAGNOSIS — S06.6XAS: ICD-10-CM

## 2024-08-27 DIAGNOSIS — I77.9 CAROTID ARTERY DISEASE, UNSPECIFIED LATERALITY, UNSPECIFIED TYPE (HCC): ICD-10-CM

## 2024-08-27 DIAGNOSIS — I62.9 INTRACRANIAL HEMORRHAGE (HCC): ICD-10-CM

## 2024-08-27 DIAGNOSIS — N17.9 AKI (ACUTE KIDNEY INJURY) (HCC): ICD-10-CM

## 2024-08-27 DIAGNOSIS — K55.039 ACUTE ISCHEMIC COLITIS (HCC): Primary | ICD-10-CM

## 2024-08-27 DIAGNOSIS — N18.31 STAGE 3A CHRONIC KIDNEY DISEASE (HCC): ICD-10-CM

## 2024-08-27 DIAGNOSIS — E11.9 CONTROLLED TYPE 2 DIABETES MELLITUS WITHOUT COMPLICATION, WITHOUT LONG-TERM CURRENT USE OF INSULIN (HCC): Chronic | ICD-10-CM

## 2024-08-27 PROCEDURE — 99305 1ST NF CARE MODERATE MDM 35: CPT | Performed by: FAMILY MEDICINE

## 2024-08-27 ASSESSMENT — ENCOUNTER SYMPTOMS
EYE REDNESS: 0
STRIDOR: 0
COUGH: 0
ALLERGIC/IMMUNOLOGIC NEGATIVE: 1
CHEST TIGHTNESS: 0
ABDOMINAL PAIN: 0
SINUS PAIN: 0
EYE DISCHARGE: 0
PHOTOPHOBIA: 0
SINUS PRESSURE: 0
RECTAL PAIN: 0
RHINORRHEA: 0
WHEEZING: 0
APNEA: 0
BACK PAIN: 0
CHOKING: 0
BLOOD IN STOOL: 0
NAUSEA: 0
EYE ITCHING: 0
VOMITING: 0
CONSTIPATION: 0
TROUBLE SWALLOWING: 0
SORE THROAT: 0
ABDOMINAL DISTENTION: 0
ANAL BLEEDING: 0
FACIAL SWELLING: 0
RESPIRATORY NEGATIVE: 1
EYE PAIN: 0
SHORTNESS OF BREATH: 0
DIARRHEA: 0
GASTROINTESTINAL NEGATIVE: 1
VOICE CHANGE: 0
COLOR CHANGE: 0

## 2024-08-27 NOTE — PROGRESS NOTES
calcium carbonate-vitamin D 600-200 MG-UNIT TABS Take 1 tablet by mouth daily. LD 3/11/2015      levothyroxine (SYNTHROID) 25 MCG tablet Take 1 tablet by mouth Daily       No facility-administered encounter medications on file as of 8/27/2024.       No follow-ups on file.        Reviewed recent labs related to [facility] current problems      Discussed importance of regular Health Maintenance follow up  Health Maintenance   Topic    Pneumococcal 65+ years Vaccine (1 of 2 - PCV)    Lipids     DTaP/Tdap/Td vaccine (1 - Tdap)    Shingles vaccine (1 of 2)    DEXA (modify frequency per FRAX score)     Respiratory Syncytial Virus (RSV) Pregnant or age 60 yrs+ (1 - 1-dose 60+ series)    COVID-19 Vaccine (3 - 2023-24 season)    Annual Wellness Visit (Medicare)     Flu vaccine (1)    Depression Screen     Hepatitis A vaccine     Hepatitis B vaccine     Hib vaccine     Polio vaccine     Meningococcal (ACWY) vaccine

## 2024-09-05 ENCOUNTER — HOSPITAL ENCOUNTER (OUTPATIENT)
Dept: CT IMAGING | Age: 84
Discharge: HOME | End: 2024-09-07
Attending: NEUROLOGICAL SURGERY
Payer: MEDICARE

## 2024-09-05 DIAGNOSIS — S06.6XAA TRAUMATIC SUBARACHNOID HEMORRHAGE WITH UNKNOWN LOSS OF CONSCIOUSNESS STATUS, INITIAL ENCOUNTER (HCC): ICD-10-CM

## 2024-09-05 PROCEDURE — 70450 CT HEAD/BRAIN W/O DYE: CPT
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
## 2024-09-10 ENCOUNTER — OUTSIDE SERVICES (OUTPATIENT)
Dept: FAMILY MEDICINE CLINIC | Age: 84
End: 2024-09-10

## 2024-09-10 DIAGNOSIS — E11.9 CONTROLLED TYPE 2 DIABETES MELLITUS WITHOUT COMPLICATION, WITHOUT LONG-TERM CURRENT USE OF INSULIN (HCC): Chronic | ICD-10-CM

## 2024-09-10 DIAGNOSIS — Z86.79 H/O INTRACRANIAL HEMORRHAGE: ICD-10-CM

## 2024-09-10 DIAGNOSIS — I77.9 CAROTID ARTERY DISEASE, UNSPECIFIED LATERALITY, UNSPECIFIED TYPE (HCC): Primary | ICD-10-CM

## 2024-09-10 DIAGNOSIS — N18.31 STAGE 3A CHRONIC KIDNEY DISEASE (HCC): ICD-10-CM

## 2024-09-10 DIAGNOSIS — I10 ESSENTIAL HYPERTENSION: ICD-10-CM

## 2024-09-10 PROBLEM — I62.9 INTRACRANIAL HEMORRHAGE (HCC): Status: RESOLVED | Noted: 2024-08-08 | Resolved: 2024-09-10

## 2024-09-10 PROBLEM — J12.82 PNEUMONIA DUE TO COVID-19 VIRUS: Status: RESOLVED | Noted: 2022-01-04 | Resolved: 2024-09-10

## 2024-09-10 PROBLEM — U07.1 PNEUMONIA DUE TO COVID-19 VIRUS: Status: RESOLVED | Noted: 2022-01-04 | Resolved: 2024-09-10

## 2024-09-10 PROBLEM — N17.9 AKI (ACUTE KIDNEY INJURY) (HCC): Status: RESOLVED | Noted: 2022-01-04 | Resolved: 2024-09-10

## 2024-09-10 ASSESSMENT — ENCOUNTER SYMPTOMS
COUGH: 0
WHEEZING: 0
TROUBLE SWALLOWING: 0
EYE REDNESS: 0
RESPIRATORY NEGATIVE: 1
RHINORRHEA: 0
SINUS PAIN: 0
EYE PAIN: 0
COLOR CHANGE: 0
GASTROINTESTINAL NEGATIVE: 1
CHEST TIGHTNESS: 0
STRIDOR: 0
CONSTIPATION: 0
CHOKING: 0
SINUS PRESSURE: 0
FACIAL SWELLING: 0
EYE ITCHING: 0
SORE THROAT: 0
VOICE CHANGE: 0
SHORTNESS OF BREATH: 0
VOMITING: 0
RECTAL PAIN: 0
ABDOMINAL DISTENTION: 0
EYE DISCHARGE: 0
BLOOD IN STOOL: 0
NAUSEA: 0
ALLERGIC/IMMUNOLOGIC NEGATIVE: 1
ANAL BLEEDING: 0
ABDOMINAL PAIN: 0
APNEA: 0
DIARRHEA: 0
BACK PAIN: 0
PHOTOPHOBIA: 0

## 2024-09-18 RX ORDER — ROSUVASTATIN CALCIUM 5 MG/1
5 TABLET, COATED ORAL DAILY
Qty: 30 TABLET | Refills: 3 | Status: SHIPPED | OUTPATIENT
Start: 2024-09-18

## 2024-10-01 ENCOUNTER — OFFICE VISIT (OUTPATIENT)
Dept: CARDIOLOGY CLINIC | Age: 84
End: 2024-10-01
Payer: MEDICARE

## 2024-10-01 ENCOUNTER — TELEPHONE (OUTPATIENT)
Dept: CARDIOLOGY CLINIC | Age: 84
End: 2024-10-01

## 2024-10-01 VITALS
HEART RATE: 94 BPM | DIASTOLIC BLOOD PRESSURE: 90 MMHG | RESPIRATION RATE: 16 BRPM | HEIGHT: 60 IN | WEIGHT: 160.9 LBS | SYSTOLIC BLOOD PRESSURE: 118 MMHG | BODY MASS INDEX: 31.59 KG/M2

## 2024-10-01 DIAGNOSIS — E78.00 HYPERCHOLESTEREMIA: ICD-10-CM

## 2024-10-01 DIAGNOSIS — I10 ESSENTIAL HYPERTENSION: ICD-10-CM

## 2024-10-01 DIAGNOSIS — I25.10 CORONARY ARTERY DISEASE INVOLVING NATIVE CORONARY ARTERY OF NATIVE HEART WITHOUT ANGINA PECTORIS: Primary | ICD-10-CM

## 2024-10-01 DIAGNOSIS — I25.5 ISCHEMIC CARDIOMYOPATHY: ICD-10-CM

## 2024-10-01 DIAGNOSIS — I50.9 ACUTE DECOMPENSATED HEART FAILURE (HCC): ICD-10-CM

## 2024-10-01 PROCEDURE — 99214 OFFICE O/P EST MOD 30 MIN: CPT | Performed by: INTERNAL MEDICINE

## 2024-10-01 PROCEDURE — 1090F PRES/ABSN URINE INCON ASSESS: CPT | Performed by: INTERNAL MEDICINE

## 2024-10-01 PROCEDURE — G8400 PT W/DXA NO RESULTS DOC: HCPCS | Performed by: INTERNAL MEDICINE

## 2024-10-01 PROCEDURE — G8417 CALC BMI ABV UP PARAM F/U: HCPCS | Performed by: INTERNAL MEDICINE

## 2024-10-01 PROCEDURE — 1036F TOBACCO NON-USER: CPT | Performed by: INTERNAL MEDICINE

## 2024-10-01 PROCEDURE — 3074F SYST BP LT 130 MM HG: CPT | Performed by: INTERNAL MEDICINE

## 2024-10-01 PROCEDURE — 1123F ACP DISCUSS/DSCN MKR DOCD: CPT | Performed by: INTERNAL MEDICINE

## 2024-10-01 PROCEDURE — 3080F DIAST BP >= 90 MM HG: CPT | Performed by: INTERNAL MEDICINE

## 2024-10-01 PROCEDURE — G8427 DOCREV CUR MEDS BY ELIG CLIN: HCPCS | Performed by: INTERNAL MEDICINE

## 2024-10-01 PROCEDURE — G8484 FLU IMMUNIZE NO ADMIN: HCPCS | Performed by: INTERNAL MEDICINE

## 2024-10-01 PROCEDURE — 93000 ELECTROCARDIOGRAM COMPLETE: CPT | Performed by: INTERNAL MEDICINE

## 2024-10-01 RX ORDER — ASPIRIN 81 MG/1
81 TABLET ORAL DAILY
COMMUNITY

## 2024-10-01 RX ORDER — ISOSORBIDE MONONITRATE 30 MG/1
30 TABLET, EXTENDED RELEASE ORAL DAILY
COMMUNITY

## 2024-10-01 NOTE — TELEPHONE ENCOUNTER
----- Message from Dr. Jerome Campuzano MD sent at 10/1/2024  2:07 PM EDT -----  She had a Holter monitor at the hospital.  Can we look to see what the report is?    Thank you.

## 2024-10-01 NOTE — TELEPHONE ENCOUNTER
Josselyn Campuzano I called [employer]  monitor with be ready for your review within 24 hours.

## 2024-10-01 NOTE — PATIENT INSTRUCTIONS
Continue all your medications at current doses.   Take an extra 20 mg lasix for 3 days. Call with update at the end of the week.   Restrict sodium intake to less than 2-2.5 g/day. Restrict fluid intake to less than 2.2 L/day. Goal BP is less than 130/80.   If your weight increases by > 2 lbs in a day or >5 lbs in more than a day, take an extra lasix pill.   Please try to exercise for 150 minutes a week.   I will see you back in the office in 6 months. Please call the office at ([phone], option [phone]) if you have any questions.

## 2024-10-03 DIAGNOSIS — R55 SYNCOPE, UNSPECIFIED SYNCOPE TYPE: ICD-10-CM

## 2024-10-08 DIAGNOSIS — R00.2 PALPITATIONS: ICD-10-CM

## 2024-10-08 DIAGNOSIS — R55 SYNCOPE, UNSPECIFIED SYNCOPE TYPE: ICD-10-CM

## 2024-10-23 ENCOUNTER — TELEPHONE (OUTPATIENT)
Dept: CARDIOLOGY CLINIC | Age: 84
End: 2024-10-23

## 2024-10-23 NOTE — TELEPHONE ENCOUNTER
----- Message from Dr. Jerome Campuzano MD sent at 10/23/2024  9:44 AM EDT -----  She has frequent PVCs that appear to be symptomatic.  If she is still having symptoms, we can do Coreg 25 in the morning and 12.5 at night

## 2025-02-21 RX ORDER — ROSUVASTATIN CALCIUM 5 MG/1
5 TABLET, COATED ORAL DAILY
Qty: 30 TABLET | Refills: 5 | Status: SHIPPED | OUTPATIENT
Start: 2025-02-21

## 2025-04-17 ENCOUNTER — OFFICE VISIT (OUTPATIENT)
Dept: CARDIOLOGY CLINIC | Age: 85
End: 2025-04-17
Payer: MEDICARE

## 2025-04-17 VITALS
SYSTOLIC BLOOD PRESSURE: 118 MMHG | WEIGHT: 157.3 LBS | DIASTOLIC BLOOD PRESSURE: 74 MMHG | HEART RATE: 88 BPM | BODY MASS INDEX: 29.7 KG/M2 | HEIGHT: 61 IN | RESPIRATION RATE: 18 BRPM | OXYGEN SATURATION: 97 % | TEMPERATURE: 98 F

## 2025-04-17 DIAGNOSIS — I25.5 ISCHEMIC CARDIOMYOPATHY: ICD-10-CM

## 2025-04-17 DIAGNOSIS — E78.00 HYPERCHOLESTEREMIA: ICD-10-CM

## 2025-04-17 DIAGNOSIS — I50.32 CHRONIC HEART FAILURE WITH PRESERVED EJECTION FRACTION (HCC): ICD-10-CM

## 2025-04-17 DIAGNOSIS — I25.10 CORONARY ARTERY DISEASE, UNSPECIFIED VESSEL OR LESION TYPE, UNSPECIFIED WHETHER ANGINA PRESENT, UNSPECIFIED WHETHER NATIVE OR TRANSPLANTED HEART: Primary | ICD-10-CM

## 2025-04-17 DIAGNOSIS — I10 ESSENTIAL HYPERTENSION: ICD-10-CM

## 2025-04-17 DIAGNOSIS — I10 PRIMARY HYPERTENSION: ICD-10-CM

## 2025-04-17 PROCEDURE — G8400 PT W/DXA NO RESULTS DOC: HCPCS | Performed by: INTERNAL MEDICINE

## 2025-04-17 PROCEDURE — 3074F SYST BP LT 130 MM HG: CPT | Performed by: INTERNAL MEDICINE

## 2025-04-17 PROCEDURE — 1036F TOBACCO NON-USER: CPT | Performed by: INTERNAL MEDICINE

## 2025-04-17 PROCEDURE — 1123F ACP DISCUSS/DSCN MKR DOCD: CPT | Performed by: INTERNAL MEDICINE

## 2025-04-17 PROCEDURE — G2211 COMPLEX E/M VISIT ADD ON: HCPCS | Performed by: INTERNAL MEDICINE

## 2025-04-17 PROCEDURE — 1090F PRES/ABSN URINE INCON ASSESS: CPT | Performed by: INTERNAL MEDICINE

## 2025-04-17 PROCEDURE — 1159F MED LIST DOCD IN RCRD: CPT | Performed by: INTERNAL MEDICINE

## 2025-04-17 PROCEDURE — 99214 OFFICE O/P EST MOD 30 MIN: CPT | Performed by: INTERNAL MEDICINE

## 2025-04-17 PROCEDURE — G8417 CALC BMI ABV UP PARAM F/U: HCPCS | Performed by: INTERNAL MEDICINE

## 2025-04-17 PROCEDURE — 93000 ELECTROCARDIOGRAM COMPLETE: CPT | Performed by: INTERNAL MEDICINE

## 2025-04-17 PROCEDURE — 3078F DIAST BP <80 MM HG: CPT | Performed by: INTERNAL MEDICINE

## 2025-04-17 PROCEDURE — G8427 DOCREV CUR MEDS BY ELIG CLIN: HCPCS | Performed by: INTERNAL MEDICINE

## 2025-04-17 RX ORDER — LISINOPRIL 20 MG/1
TABLET ORAL
COMMUNITY
Start: 2025-01-21

## 2025-04-17 RX ORDER — IRON POLYSACCHARIDE COMPLEX 150 MG
150 CAPSULE ORAL 2 TIMES DAILY
COMMUNITY
Start: 2025-01-11

## 2025-04-17 NOTE — PATIENT INSTRUCTIONS
Continue all your medications at current doses.   Restrict sodium intake to less than 2-2.5 g/day. Restrict fluid intake to less than 2.2 L/day. Goal BP is less than 130/80.   If your weight increases by > 2 lbs in a day or >5 lbs in more than a day, take an extra lasix pill.   Please try to exercise for 150 minutes a week.   I will see you back in the office in 6 months. Please call the office at [phone] [phone] for Shazia) if you have any questions.

## 2025-04-17 NOTE — PROGRESS NOTES
CHIEF COMPLAINT:   Chief Complaint   Patient presents with    Follow-up     6 month ov        HISTORY OF PRESENT ILLNESS: Patient is a 84 y.o. female seen at the request of Kati Cardona APRN - CNP for a follow-up visit with me.    She has a history of ischemic cardiomyopathy, with recovered ejection fraction, coronary artery disease status post PCI, hypertension, hyperlipidemia, moderate obesity.     She also had COVID-19 in January 2022.  She was admitted to the hospital for a period of 4 days.  Since discharge, she has been recovering well.  Lisinopril was discontinued in the hospital likely for cough.      She was having right gluteal muscle spasms.  She was prescribed muscle relaxers.  She did have a syncopal episode attributed to the muscle relaxers.  She fell on her face out of her bed.  She presented to the hospital complaining of headaches.  Found to have scattered subarachnoid hemorrhage.  She was managed conservatively.  She was found to have hypotension and medications were adjusted.  Subsequently, she was discharged.  Since discharge, no further visits to the hospital or emergency room.  She has stopped taking the lisinopril on account of ongoing cough.  The cough is since improved.  Her Coreg was cut down to half the dose.    At today's office visit, She denies any chest pain, shortness of breath, palpitations, dizziness, pedal edema. The patient is capable of activities of daily living. There is dyspnea on more than moderate exertion. There is no orthopnea or PND. She is compliant with medications as well as diet and exercise regimen.    Prior Cardiac workup:  History of ischemic cardiomyopathy since 2007, with echocardiogram at      that time showing EF of 25%, and a cardiac catheterization in May 2007,      showing LAD stenosis; 80% to 90%; and an EF of 20% to 25%.  No disease of      the circumflex or RCA.  Had a drug-eluting stent to the LAD with last      Lexiscan nuclear stress test in June